# Patient Record
Sex: FEMALE | Race: WHITE | NOT HISPANIC OR LATINO | Employment: FULL TIME | URBAN - METROPOLITAN AREA
[De-identification: names, ages, dates, MRNs, and addresses within clinical notes are randomized per-mention and may not be internally consistent; named-entity substitution may affect disease eponyms.]

---

## 2023-03-07 ENCOUNTER — TELEPHONE (OUTPATIENT)
Dept: PRIMARY CARE | Facility: CLINIC | Age: 68
End: 2023-03-07
Payer: MEDICARE

## 2023-03-14 LAB
ALANINE AMINOTRANSFERASE (SGPT) (U/L) IN SER/PLAS: 12 U/L (ref 7–45)
ALBUMIN (G/DL) IN SER/PLAS: 4.2 G/DL (ref 3.4–5)
ALKALINE PHOSPHATASE (U/L) IN SER/PLAS: 74 U/L (ref 33–136)
ANION GAP IN SER/PLAS: 13 MMOL/L (ref 10–20)
ASPARTATE AMINOTRANSFERASE (SGOT) (U/L) IN SER/PLAS: 20 U/L (ref 9–39)
BASOPHILS (10*3/UL) IN BLOOD BY AUTOMATED COUNT: 0.07 X10E9/L (ref 0–0.1)
BASOPHILS/100 LEUKOCYTES IN BLOOD BY AUTOMATED COUNT: 1.3 % (ref 0–2)
BILIRUBIN TOTAL (MG/DL) IN SER/PLAS: 0.4 MG/DL (ref 0–1.2)
CALCIDIOL (25 OH VITAMIN D3) (NG/ML) IN SER/PLAS: 37 NG/ML
CALCIUM (MG/DL) IN SER/PLAS: 9.8 MG/DL (ref 8.6–10.3)
CARBON DIOXIDE, TOTAL (MMOL/L) IN SER/PLAS: 27 MMOL/L (ref 21–32)
CHLORIDE (MMOL/L) IN SER/PLAS: 103 MMOL/L (ref 98–107)
CREATININE (MG/DL) IN SER/PLAS: 1.06 MG/DL (ref 0.5–1.05)
EOSINOPHILS (10*3/UL) IN BLOOD BY AUTOMATED COUNT: 0.16 X10E9/L (ref 0–0.7)
EOSINOPHILS/100 LEUKOCYTES IN BLOOD BY AUTOMATED COUNT: 2.9 % (ref 0–6)
ERYTHROCYTE DISTRIBUTION WIDTH (RATIO) BY AUTOMATED COUNT: 15.8 % (ref 11.5–14.5)
ERYTHROCYTE MEAN CORPUSCULAR HEMOGLOBIN CONCENTRATION (G/DL) BY AUTOMATED: 31.3 G/DL (ref 32–36)
ERYTHROCYTE MEAN CORPUSCULAR VOLUME (FL) BY AUTOMATED COUNT: 85 FL (ref 80–100)
ERYTHROCYTES (10*6/UL) IN BLOOD BY AUTOMATED COUNT: 4.32 X10E12/L (ref 4–5.2)
GFR FEMALE: 57 ML/MIN/1.73M2
GLUCOSE (MG/DL) IN SER/PLAS: 89 MG/DL (ref 74–99)
HEMATOCRIT (%) IN BLOOD BY AUTOMATED COUNT: 36.7 % (ref 36–46)
HEMOGLOBIN (G/DL) IN BLOOD: 11.5 G/DL (ref 12–16)
IMMATURE GRANULOCYTES/100 LEUKOCYTES IN BLOOD BY AUTOMATED COUNT: 0.2 % (ref 0–0.9)
LEUKOCYTES (10*3/UL) IN BLOOD BY AUTOMATED COUNT: 5.6 X10E9/L (ref 4.4–11.3)
LYMPHOCYTES (10*3/UL) IN BLOOD BY AUTOMATED COUNT: 1.55 X10E9/L (ref 1.2–4.8)
LYMPHOCYTES/100 LEUKOCYTES IN BLOOD BY AUTOMATED COUNT: 27.9 % (ref 13–44)
MONOCYTES (10*3/UL) IN BLOOD BY AUTOMATED COUNT: 0.59 X10E9/L (ref 0.1–1)
MONOCYTES/100 LEUKOCYTES IN BLOOD BY AUTOMATED COUNT: 10.6 % (ref 2–10)
NEUTROPHILS (10*3/UL) IN BLOOD BY AUTOMATED COUNT: 3.17 X10E9/L (ref 1.2–7.7)
NEUTROPHILS/100 LEUKOCYTES IN BLOOD BY AUTOMATED COUNT: 57.1 % (ref 40–80)
PLATELETS (10*3/UL) IN BLOOD AUTOMATED COUNT: 244 X10E9/L (ref 150–450)
POTASSIUM (MMOL/L) IN SER/PLAS: 4.1 MMOL/L (ref 3.5–5.3)
PROTEIN TOTAL: 8.3 G/DL (ref 6.4–8.2)
SODIUM (MMOL/L) IN SER/PLAS: 139 MMOL/L (ref 136–145)
THYROTROPIN (MIU/L) IN SER/PLAS BY DETECTION LIMIT <= 0.05 MIU/L: 5.84 MIU/L (ref 0.44–3.98)
THYROXINE (T4) FREE (NG/DL) IN SER/PLAS: 0.88 NG/DL (ref 0.61–1.12)
UREA NITROGEN (MG/DL) IN SER/PLAS: 17 MG/DL (ref 6–23)

## 2023-03-15 LAB
PARATHYRIN INTACT (PG/ML) IN SER/PLAS: 40.3 PG/ML (ref 18.5–88)
POC CALCIUM IONIZED (MMOL/L) IN BLOOD: 1.28 MMOL/L (ref 1.1–1.33)

## 2023-04-17 LAB
ALBUMIN ELP: 3.7 G/DL (ref 3.4–5)
ALPHA 1: 0.3 G/DL (ref 0.2–0.6)
ALPHA 2: 1.1 G/DL (ref 0.4–1.1)
BETA: 1.1 G/DL (ref 0.5–1.2)
GAMMA GLOBULIN: 1.4 G/DL (ref 0.5–1.4)
PATH REVIEW-SERUM PROTEIN ELECTROPHORESIS: NORMAL
PROTEIN ELECTROPHORESIS INTERPRETATION: NORMAL
PROTEIN TOTAL: 7.6 G/DL (ref 6.4–8.2)

## 2023-08-31 PROBLEM — R79.89 ELEVATED LFTS: Status: ACTIVE | Noted: 2023-08-31

## 2023-08-31 PROBLEM — H25.091 AGE-RELATED INCIPIENT CATARACT OF RIGHT EYE: Status: ACTIVE | Noted: 2023-08-31

## 2023-08-31 PROBLEM — R94.4 DECREASED GFR: Status: ACTIVE | Noted: 2023-08-31

## 2023-08-31 PROBLEM — F41.9 ANXIETY AND DEPRESSION: Status: ACTIVE | Noted: 2023-08-31

## 2023-08-31 PROBLEM — R82.81 PYURIA: Status: ACTIVE | Noted: 2023-08-31

## 2023-08-31 PROBLEM — M19.90 OSTEOARTHRITIS: Status: ACTIVE | Noted: 2023-08-31

## 2023-08-31 PROBLEM — N32.89 MASS OF URINARY BLADDER DETERMINED BY ULTRASOUND: Status: ACTIVE | Noted: 2023-08-31

## 2023-08-31 PROBLEM — R80.9 PROTEINURIA: Status: ACTIVE | Noted: 2023-08-31

## 2023-08-31 PROBLEM — M81.0 OSTEOPOROSIS: Status: ACTIVE | Noted: 2023-08-31

## 2023-08-31 PROBLEM — E03.9 HYPOTHYROIDISM: Status: ACTIVE | Noted: 2023-08-31

## 2023-08-31 PROBLEM — R76.8 ANA POSITIVE: Status: ACTIVE | Noted: 2023-08-31

## 2023-08-31 PROBLEM — I10 ESSENTIAL HYPERTENSION: Status: ACTIVE | Noted: 2023-08-31

## 2023-08-31 PROBLEM — H35.351 CYSTOID MACULAR EDEMA OF RIGHT EYE: Status: ACTIVE | Noted: 2023-08-31

## 2023-08-31 PROBLEM — E66.3 OVERWEIGHT: Status: ACTIVE | Noted: 2023-08-31

## 2023-08-31 PROBLEM — R20.9 SENSORY DISTURBANCE: Status: ACTIVE | Noted: 2023-08-31

## 2023-08-31 PROBLEM — G89.29 BACK PAIN, CHRONIC: Status: ACTIVE | Noted: 2023-08-31

## 2023-08-31 PROBLEM — H33.21 RIGHT RETINAL DETACHMENT: Status: ACTIVE | Noted: 2023-08-31

## 2023-08-31 PROBLEM — N18.32 STAGE 3B CHRONIC KIDNEY DISEASE (MULTI): Status: ACTIVE | Noted: 2023-08-31

## 2023-08-31 PROBLEM — K75.4 AUTOIMMUNE HEPATITIS (MULTI): Status: ACTIVE | Noted: 2023-08-31

## 2023-08-31 PROBLEM — R29.898 ABNORMAL MUSCLE TONE: Status: ACTIVE | Noted: 2023-08-31

## 2023-08-31 PROBLEM — M11.20 CALCIUM PYROPHOSPHATE DEPOSITION DISEASE: Status: ACTIVE | Noted: 2023-08-31

## 2023-08-31 PROBLEM — R73.01 IMPAIRED FASTING GLUCOSE: Status: ACTIVE | Noted: 2023-08-31

## 2023-08-31 PROBLEM — M54.9 BACK PAIN, CHRONIC: Status: ACTIVE | Noted: 2023-08-31

## 2023-08-31 PROBLEM — I95.9 LOW BLOOD PRESSURE: Status: ACTIVE | Noted: 2023-08-31

## 2023-08-31 PROBLEM — M35.00 SJOGREN'S SYNDROME (MULTI): Status: ACTIVE | Noted: 2023-08-31

## 2023-08-31 PROBLEM — D64.9 ANEMIA: Status: ACTIVE | Noted: 2023-08-31

## 2023-08-31 PROBLEM — N64.89 BREAST ASYMMETRY IN FEMALE: Status: ACTIVE | Noted: 2023-08-31

## 2023-08-31 PROBLEM — F32.A ANXIETY AND DEPRESSION: Status: ACTIVE | Noted: 2023-08-31

## 2023-08-31 PROBLEM — M25.50 ARTHRALGIA: Status: ACTIVE | Noted: 2023-08-31

## 2023-08-31 PROBLEM — E55.9 VITAMIN D DEFICIENCY: Status: ACTIVE | Noted: 2023-08-31

## 2023-08-31 PROBLEM — E78.5 HYPERLIPIDEMIA: Status: ACTIVE | Noted: 2023-08-31

## 2023-08-31 PROBLEM — E21.3 HYPERPARATHYROIDISM (MULTI): Status: ACTIVE | Noted: 2023-08-31

## 2023-08-31 PROBLEM — M54.17 LUMBOSACRAL RADICULITIS: Status: ACTIVE | Noted: 2023-08-31

## 2023-08-31 PROBLEM — H04.123 DRY EYE SYNDROME OF BOTH EYES: Status: ACTIVE | Noted: 2023-08-31

## 2023-08-31 RX ORDER — LEVOTHYROXINE SODIUM 50 UG/1
1 TABLET ORAL EVERY OTHER DAY
COMMUNITY
End: 2023-11-17 | Stop reason: WASHOUT

## 2023-08-31 RX ORDER — ATENOLOL 50 MG/1
1 TABLET ORAL DAILY
COMMUNITY
End: 2023-12-18

## 2023-08-31 RX ORDER — SERTRALINE HYDROCHLORIDE 25 MG/1
1 TABLET, FILM COATED ORAL DAILY
COMMUNITY
Start: 2021-01-22 | End: 2024-01-10 | Stop reason: WASHOUT

## 2023-08-31 RX ORDER — AMLODIPINE BESYLATE 10 MG/1
1 TABLET ORAL DAILY
COMMUNITY
Start: 2022-03-07 | End: 2023-10-11

## 2023-08-31 RX ORDER — CHOLECALCIFEROL (VITAMIN D3) 125 MCG
125 CAPSULE ORAL
COMMUNITY
Start: 2016-04-14 | End: 2024-01-10 | Stop reason: ALTCHOICE

## 2023-08-31 RX ORDER — HYDROXYCHLOROQUINE SULFATE 200 MG/1
1 TABLET, FILM COATED ORAL DAILY
COMMUNITY
Start: 2016-10-17 | End: 2024-02-01

## 2023-08-31 RX ORDER — PILOCARPINE HYDROCHLORIDE 5 MG/1
1 TABLET, FILM COATED ORAL 3 TIMES DAILY
COMMUNITY
Start: 2019-04-19 | End: 2024-01-10 | Stop reason: WASHOUT

## 2023-08-31 RX ORDER — ATENOLOL 100 MG/1
1 TABLET ORAL DAILY
COMMUNITY
Start: 2016-10-03 | End: 2023-12-18 | Stop reason: WASHOUT

## 2023-08-31 RX ORDER — DULOXETIN HYDROCHLORIDE 60 MG/1
1 CAPSULE, DELAYED RELEASE ORAL DAILY
COMMUNITY
End: 2024-01-10 | Stop reason: SDUPTHER

## 2023-08-31 RX ORDER — CYCLOBENZAPRINE HCL 5 MG
1 TABLET ORAL NIGHTLY PRN
COMMUNITY
Start: 2021-07-22 | End: 2024-01-10 | Stop reason: WASHOUT

## 2023-08-31 RX ORDER — ATORVASTATIN CALCIUM 40 MG/1
1 TABLET, FILM COATED ORAL DAILY
COMMUNITY
Start: 2016-10-03 | End: 2023-12-13

## 2023-08-31 RX ORDER — AMLODIPINE BESYLATE 5 MG/1
1 TABLET ORAL NIGHTLY
COMMUNITY
Start: 2016-10-03 | End: 2023-10-11 | Stop reason: DRUGHIGH

## 2023-08-31 RX ORDER — CYCLOSPORINE 0.5 MG/ML
1 EMULSION OPHTHALMIC EVERY 12 HOURS
COMMUNITY
Start: 2019-07-10 | End: 2023-11-29 | Stop reason: SDUPTHER

## 2023-08-31 RX ORDER — LEVOTHYROXINE SODIUM 25 UG/1
1 TABLET ORAL DAILY
COMMUNITY
Start: 2016-10-03 | End: 2023-11-17 | Stop reason: WASHOUT

## 2023-08-31 RX ORDER — GABAPENTIN 600 MG/1
1 TABLET ORAL 3 TIMES DAILY
COMMUNITY
Start: 2022-11-16 | End: 2024-01-08 | Stop reason: SDUPTHER

## 2023-08-31 RX ORDER — LEVOTHYROXINE SODIUM 25 UG/1
1 TABLET ORAL
COMMUNITY
End: 2023-11-17 | Stop reason: WASHOUT

## 2023-08-31 RX ORDER — BENAZEPRIL HYDROCHLORIDE AND HYDROCHLOROTHIAZIDE 20; 25 MG/1; MG/1
1 TABLET ORAL DAILY
COMMUNITY
End: 2024-01-10 | Stop reason: ALTCHOICE

## 2023-08-31 RX ORDER — BUTALB/ACETAMINOPHEN/CAFFEINE 50-325-40
1 TABLET ORAL 2 TIMES DAILY
COMMUNITY
Start: 2020-10-25 | End: 2024-01-10 | Stop reason: ALTCHOICE

## 2023-08-31 RX ORDER — CALCIUM CITRATE/VITAMIN D3 315MG-6.25
2 TABLET ORAL 2 TIMES DAILY
COMMUNITY
End: 2024-01-10 | Stop reason: ALTCHOICE

## 2023-08-31 RX ORDER — CEVIMELINE HYDROCHLORIDE 30 MG/1
1 CAPSULE ORAL 3 TIMES DAILY
COMMUNITY
End: 2024-04-22

## 2023-08-31 RX ORDER — DENOSUMAB 60 MG/ML
60 INJECTION SUBCUTANEOUS
COMMUNITY
Start: 2021-12-20 | End: 2023-10-03 | Stop reason: SDUPTHER

## 2023-09-06 LAB — CALCIDIOL (25 OH VITAMIN D3) (NG/ML) IN SER/PLAS: 36 NG/ML

## 2023-09-22 LAB
ALANINE AMINOTRANSFERASE (SGPT) (U/L) IN SER/PLAS: 16 U/L (ref 7–45)
ALBUMIN (G/DL) IN SER/PLAS: 4 G/DL (ref 3.4–5)
ALKALINE PHOSPHATASE (U/L) IN SER/PLAS: 67 U/L (ref 33–136)
ASPARTATE AMINOTRANSFERASE (SGOT) (U/L) IN SER/PLAS: 27 U/L (ref 9–39)
BILIRUBIN DIRECT (MG/DL) IN SER/PLAS: 0.1 MG/DL (ref 0–0.3)
BILIRUBIN TOTAL (MG/DL) IN SER/PLAS: 0.4 MG/DL (ref 0–1.2)
PROTEIN TOTAL: 8 G/DL (ref 6.4–8.2)

## 2023-10-03 DIAGNOSIS — M81.0 OSTEOPOROSIS, UNSPECIFIED OSTEOPOROSIS TYPE, UNSPECIFIED PATHOLOGICAL FRACTURE PRESENCE: Primary | ICD-10-CM

## 2023-10-03 RX ORDER — ALBUTEROL SULFATE 0.83 MG/ML
3 SOLUTION RESPIRATORY (INHALATION) AS NEEDED
Status: CANCELLED | OUTPATIENT
Start: 2023-10-16

## 2023-10-03 RX ORDER — FAMOTIDINE 10 MG/ML
20 INJECTION INTRAVENOUS ONCE AS NEEDED
Status: CANCELLED | OUTPATIENT
Start: 2023-10-16

## 2023-10-03 RX ORDER — EPINEPHRINE 0.3 MG/.3ML
0.3 INJECTION SUBCUTANEOUS EVERY 5 MIN PRN
Status: CANCELLED | OUTPATIENT
Start: 2023-10-16

## 2023-10-03 RX ORDER — DIPHENHYDRAMINE HYDROCHLORIDE 50 MG/ML
50 INJECTION INTRAMUSCULAR; INTRAVENOUS AS NEEDED
Status: CANCELLED | OUTPATIENT
Start: 2023-10-16

## 2023-10-03 NOTE — PROGRESS NOTES
EPIC order conversion -- continuation of Prolia 60mg q 6 months. Next [facility] 10/16/23 at [facility].

## 2023-10-12 ENCOUNTER — TELEPHONE (OUTPATIENT)
Dept: PRIMARY CARE | Facility: CLINIC | Age: 68
End: 2023-10-12

## 2023-10-12 ENCOUNTER — LAB (OUTPATIENT)
Dept: LAB | Facility: LAB | Age: 68
End: 2023-10-12
Payer: MEDICARE

## 2023-10-12 DIAGNOSIS — M81.0 OSTEOPOROSIS, UNSPECIFIED OSTEOPOROSIS TYPE, UNSPECIFIED PATHOLOGICAL FRACTURE PRESENCE: ICD-10-CM

## 2023-10-12 LAB
ALBUMIN SERPL BCP-MCNC: 4.1 G/DL (ref 3.4–5)
ALP SERPL-CCNC: 66 U/L (ref 33–136)
ALT SERPL W P-5'-P-CCNC: 15 U/L (ref 7–45)
ANION GAP SERPL CALC-SCNC: 13 MMOL/L (ref 10–20)
AST SERPL W P-5'-P-CCNC: 25 U/L (ref 9–39)
BILIRUB SERPL-MCNC: 0.4 MG/DL (ref 0–1.2)
BUN SERPL-MCNC: 21 MG/DL (ref 6–23)
CALCIUM SERPL-MCNC: 9.5 MG/DL (ref 8.6–10.3)
CHLORIDE SERPL-SCNC: 103 MMOL/L (ref 98–107)
CO2 SERPL-SCNC: 26 MMOL/L (ref 21–32)
CREAT SERPL-MCNC: 1.29 MG/DL (ref 0.5–1.05)
GFR SERPL CREATININE-BSD FRML MDRD: 45 ML/MIN/1.73M*2
GLUCOSE SERPL-MCNC: 91 MG/DL (ref 74–99)
POTASSIUM SERPL-SCNC: 3.9 MMOL/L (ref 3.5–5.3)
PROT SERPL-MCNC: 7.9 G/DL (ref 6.4–8.2)
SODIUM SERPL-SCNC: 138 MMOL/L (ref 136–145)

## 2023-10-12 PROCEDURE — 36415 COLL VENOUS BLD VENIPUNCTURE: CPT

## 2023-10-12 PROCEDURE — 80053 COMPREHEN METABOLIC PANEL: CPT

## 2023-10-12 NOTE — TELEPHONE ENCOUNTER
Okay for your return. Aleisha from  specialty pharmacy is calling on behalf of patient. She said that patient's medication is still rejecting and they are wondering if you have submitted a PA or not this or not? They did not disclose which medication they are referring to.   [phone]

## 2023-10-13 ENCOUNTER — OFFICE VISIT (OUTPATIENT)
Dept: PAIN MEDICINE | Facility: CLINIC | Age: 68
End: 2023-10-13
Payer: MEDICARE

## 2023-10-13 VITALS
RESPIRATION RATE: 16 BRPM | DIASTOLIC BLOOD PRESSURE: 63 MMHG | TEMPERATURE: 96 F | SYSTOLIC BLOOD PRESSURE: 111 MMHG | HEART RATE: 80 BPM

## 2023-10-13 DIAGNOSIS — M54.17 LUMBOSACRAL RADICULITIS: Primary | ICD-10-CM

## 2023-10-13 PROCEDURE — 99213 OFFICE O/P EST LOW 20 MIN: CPT | Performed by: PHYSICIAN ASSISTANT

## 2023-10-13 PROCEDURE — 99212 OFFICE O/P EST SF 10 MIN: CPT | Mod: ZK | Performed by: PHYSICIAN ASSISTANT

## 2023-10-13 RX ORDER — GABAPENTIN 600 MG/1
600 TABLET ORAL 3 TIMES DAILY
Qty: 90 TABLET | Refills: 2 | Status: SHIPPED | OUTPATIENT
Start: 2023-10-13 | End: 2024-10-12

## 2023-10-13 ASSESSMENT — PAIN SCALES - GENERAL: PAINLEVEL: 10-WORST PAIN EVER

## 2023-10-13 NOTE — PATIENT INSTRUCTIONS
Follow up in 3 months or sooner if needed    Call if back pain is not better or worsening or if you would like to schedule an injection     Please do not hesitate to contact the pain clinic after your visit with any questions or concerns at  M-F 8-4 pm

## 2023-10-13 NOTE — PROGRESS NOTES
"Subjective   Patient ID: Loulou Hand is a 68 y.o. female who presents for  refill; pain to the lower back goes down buttocks rates 10 /10  HPI    Denies any new health issues since last visit  Pain level 6-7/10 across her lower back to her buttocks   Denies any numbness or tingling   She alternates heat and ice to help with the pain   \"I was taking gabpentin but I am out of it,\" says she has been out of it for 2-3 weeks   Back pain started about 3 weeks  when she ran out of her gabapentin   Denies any side effects when she was on gabapentin   Had 3 epidurals done for her back, says the first two helped but the 3rd one,\"Did nothing,\" yet last two epidurals were both a year apart suggesting that they may have indeed provided some relief   Bending aggravates her pain, sitting down also aggravates the pain  When she stands up from a sitting position, she feels stiff  Last lumbar epidural was 12/2022    Patient denies any new onset of weakness in the lower extremity or upper extremity or any new bladder or bowel dysfunction.      Review of Systems  All 13 systems were reviewed and are within normal levels except as noted below or per HPI. Positive and pertinent negative responses are noted below or in the HPI   Denied any fever or chills. No weight loss and no night sweats. No cough or sputum production. No diarrhea   No constipation  No bladder and bowel incontinence and no other changes in bladder and bowel. No skin changes.   Denied opioids diversion and abuse and denies alcoholism. Denies overuse of pain medications.      Past medical history  interval changes as noted in hpi    Objective   Physical Exam  General:   Alert, oriented, pleasant, and cooperative. Does not appear to be in any major distress.     HEENT:   Pupils normal in size. Ears, nose, mouth, and throat appear to be in normal condition. Head atraumatic.   No signs of sedation or withdrawal apparent.    Psychiatric:   No signs of depression " apparent.     Neuro:   No focal neurological deficit apparent. Ambulation at baseline.     Respiratory:   Normal respiratory distress     Abdomen:   No distention     Skin:   No skin markings supportive of recent IV drug use.     Cardiovascular:   Regular rate.        Assessment/Plan   Diagnoses and all orders for this visit:  Lumbosacral radiculitis  -     gabapentin (Neurontin) 600 mg tablet; Take 1 tablet (600 mg) by mouth 3 times a day.  -     Schedule appointment; Future    Plan:  Restart gabapentin   Call if back pain is not better or worsening or if you would like to schedule an injection   Follow up in 3 months or sooner if needed    [place] dated TODAY  reviewed. No signs or abuse or misuse of prescribed medication.    Patient was reminded not to share medications, not to take prescription medications that were not prescribed to  the patient, and not to increase or change dose without consulting the pain clinic. I advised the patient to always the least amount of medication needed to keep symptoms under control.    Advised to continue following the guidelines to prevent the spread of COVID19  1. Frequent handwashing.  2. Avoiding touching face.  3. Sneeze or cough into a tissue or inside your elbow.  4. Disinfect frequently used items and surfaces as much as possible.  5. Wear a mask around people.  6. If you feel sick stay home.  7. Practice social distancing.  8. Avoid discretionary traveling, shopping trips, and social visits    Assessment/Plan:. The most applicable treatment options considered and discussed with the patient at this time, including a combination of physical therapy approaches, psychological/psychiatric approaches, pharmacologic management, interventional procedures and pain management surgeries (such as spinal cord stimulation and intrathecal pump placement. Risk of complications and/or morbidity and mortality is high given the acute and chronic pain may pose a threat to life and bodily  functions if undertreated, poorly treated or with failure to maintain adequate and timely follow-up. Given the serious and fluctuating nature of pain (with extensive considerations whenever pain changes, worsens and even if it improves), there always remains the possibility of prolonged functional impairment requiring constant patient re-assessment and high level medical decision making. The amount and complexity of data reviewed is high given the patient labs, radiology reports and other tests were obtained and reviewed (summarized as applicable). Pertinent positive and negative findings were considered in medical decision making.   The patient was counseled regarding diagnostic results, instructions for management, risk factor reductions, prognosis, patient and family education, impressions, risks and benefits of treatment options and importance of compliance with treatment.    The level of clinical decision making in this office visit, is high, given the high risks of complications with the morbidity and mortality due to the fact that acute and chronic pain may pose a threat to the life and bodily function, if under treated, poorly treated, or with failure to maintain adequate treatment and timely medical follow up. Additionally over treatment has its own set of complications including overdosing on the pain medications and also the habit forming effects of the medications used to treat chronic painful conditions including therapeutic classes classified as dangerous medications. Given the serious and fluctuating nature of pain with extensive consideration for whenever pain changes, there is always the risk of prolonged functional impairment requiring close patient assessment and reassessment and high level medical decision making at every office visit. The amount and complexity of data reviewed is high given the patient clinical presentation, labs,  data, radiology reports, and other tests as above. Pertinent  data whether positive or negative were taken in consideration in the process of making this high level medical decision.     The patient was counseled regarding risk factor reductions, prognosis, patient and family education, impressions, risks and benefits of treatment options and importance of compliance with treatment.     Total amount of time spent with patient was 16 minutes with more than 50% of the time devoted to history taking physical examination face-to-face time and coordination of care  Thank you for allowing me to participate in the care of this patient  Plan was explained to patient, and the patient verbalized understanding and agreement with the plan.           *Please note this report has been produced using speech recognition software and may contain errors related to that system including grammar, punctuation and spelling as well as words and phrases that may be inappropriate. If there are questions or concerns, please feel free to contact me to clarify.

## 2023-10-16 ENCOUNTER — APPOINTMENT (OUTPATIENT)
Dept: INFUSION THERAPY | Facility: CLINIC | Age: 68
End: 2023-10-16
Payer: MEDICARE

## 2023-10-16 ENCOUNTER — INFUSION (OUTPATIENT)
Dept: INFUSION THERAPY | Facility: CLINIC | Age: 68
End: 2023-10-16
Payer: MEDICARE

## 2023-10-16 VITALS
SYSTOLIC BLOOD PRESSURE: 141 MMHG | HEART RATE: 77 BPM | RESPIRATION RATE: 16 BRPM | DIASTOLIC BLOOD PRESSURE: 84 MMHG | OXYGEN SATURATION: 97 % | TEMPERATURE: 97.6 F

## 2023-10-16 DIAGNOSIS — M81.0 OSTEOPOROSIS, UNSPECIFIED OSTEOPOROSIS TYPE, UNSPECIFIED PATHOLOGICAL FRACTURE PRESENCE: Primary | ICD-10-CM

## 2023-10-16 PROCEDURE — 96372 THER/PROPH/DIAG INJ SC/IM: CPT | Performed by: INTERNAL MEDICINE

## 2023-10-16 RX ORDER — DIPHENHYDRAMINE HYDROCHLORIDE 50 MG/ML
50 INJECTION INTRAMUSCULAR; INTRAVENOUS AS NEEDED
Status: CANCELLED | OUTPATIENT
Start: 2024-04-13

## 2023-10-16 RX ORDER — ALBUTEROL SULFATE 0.83 MG/ML
3 SOLUTION RESPIRATORY (INHALATION) AS NEEDED
Status: CANCELLED | OUTPATIENT
Start: 2024-04-13

## 2023-10-16 RX ORDER — EPINEPHRINE 0.3 MG/.3ML
0.3 INJECTION SUBCUTANEOUS EVERY 5 MIN PRN
Status: CANCELLED | OUTPATIENT
Start: 2024-04-13

## 2023-10-16 RX ORDER — FAMOTIDINE 10 MG/ML
20 INJECTION INTRAVENOUS ONCE AS NEEDED
Status: CANCELLED | OUTPATIENT
Start: 2024-04-13

## 2023-10-16 ASSESSMENT — ENCOUNTER SYMPTOMS
FATIGUE: 1
GASTROINTESTINAL NEGATIVE: 1
PSYCHIATRIC NEGATIVE: 1
CARDIOVASCULAR NEGATIVE: 1
MUSCULOSKELETAL NEGATIVE: 1
RESPIRATORY NEGATIVE: 1
HEMATOLOGIC/LYMPHATIC NEGATIVE: 1
EYES NEGATIVE: 1
ENDOCRINE NEGATIVE: 1
NEUROLOGICAL NEGATIVE: 1

## 2023-10-16 NOTE — PATIENT INSTRUCTIONS
Today you received: ( free text drug name and dose, including premedication's)    Prolia 60 mg  Every 6 months. Have calcium level drawn at least 1 week prior to next injection.    For:   1. Osteoporosis, unspecified osteoporosis type, unspecified pathological fracture presence          Please read the  Medication Guide that was given to you and reviewed during todays visit.     (Tell all doctors including dentists that you are taking this medication)     Go to the emergency room or call 911 if:  -You have signs of allergic reaction:   o         Rash, hives, itching.   o         Swollen, blistered, peeling skin.   o         Swelling of face, lips, mouth, tongue or throat.   o         Tightness of chest, trouble breathing, swallowing or talking      Call your doctor:     - If IV / injection site gets red, warm, swollen, itchy or leaks fluid or pus.     (Leave dressing on your IV site for at least 2 hours and keep area clean and dry  - If you get sick or have symptoms of infection or are not feeling well for any reason.    (Wash your hands often, stay away from people who are sick)  - If you have side effects from your medication that do not go away or are bothersome.     (Refer to the teaching your nurse gave you for side effects to call your doctor about)     Common side effects may include:  stuffy nose, headache, feeling tired, muscle aches, upset stomach  - Before receiving any vaccines, Call the [facility] at   if:  - You get sick, are on antibiotics, have had a recent vaccine, have surgery or dental work and your doctor wants your visit rescheduled.  - You need to cancel and reschedule your visit for any reason. Call at least 2 days before your visit if you need to cancel.   - Your insurance changes before your next visit.    (We will need to get approval from your new insurance. This can take up to two weeks.)     The [facility] is opened Monday thru Friday. We are closed on  weekends and holidays.     Voice mail will take your call if the center is closed. If you leave a message please allow 24 hours for a call back during weekdays. If you leave a message on a weekend/holiday, we will call you back the next business day.      Note authored and patient cared for by: Loulou Ryan RN    Note/Encounter reviewed by: DEBBIE Petty, RAVINP-C. This provider on site at time of patient injection. Infusion staff to notify this provider of any questions, concerns, abnormals or issues during visit. No issues reported. Pt. tolerated injection without difficulty. Pt. not independently evaluated by this provider during today's encounter.

## 2023-10-16 NOTE — PROGRESS NOTES
[facility]   infusion Clinic Note   Date: 2023   Name: Louolu Hand  : 1955   MRN: [id number]         Reason for Visit: Injections (Prolia injection 60 mg)       Visit Type: INJECTION     Ordered By: BETSY   Accompanied by:Self      Diagnosis: Osteoporosis, unspecified osteoporosis type, unspecified pathological fracture presence      Allergies:   Allergies as of 10/16/2023   • (No Known Allergies)        Current Medications has a current medication list which includes the following prescription(s): amlodipine, atenolol, atenolol, atorvastatin, benazepril-hydrochlorothiazide, calcium citrate-vitamin d3, calcium citrate-vitamin d3, cevimeline, cholecalciferol, cyclobenzaprine, cyclosporine, denosumab, duloxetine, gabapentin, gabapentin, hydroxychloroquine, levothyroxine, levothyroxine, levothyroxine, pilocarpine, and sertraline.          Vitals:  Vitals:    10/16/23 1336   BP: 141/84   BP Location: Right arm   Patient Position: Sitting   BP Cuff Size: Adult   Pulse: 77   Resp: 16   Temp: 36.4 °C (97.6 °F)   TempSrc: Temporal   SpO2: 97%          Infusion Pre-procedure Checklist:   Allergies reviewed: yes   Medications reviewed: yes     Previous reaction to current treatment: No      Assess patient for the concerns below. Document provider notification as appropriate.  - Active or recent infection with/without current antibiotic use: no  - Recent or planned invasive dental work: no  - Recent or planned surgeries: no  - Recently received or plans to receive vaccinations: no  - Has treatment related toxicities: no  - Is pregnant:  no    - Does the patient meet criteria to treat? Yes    Provider notified? Not applicable      Pain: 0    Is the pain different from normal: n/a   Is the pain tolerable: n/a   Is your Doctor aware: n/a       Labs:  CALCIUM LEVEL 9.5      Fall Risk Screening:         Review of Systems   Constitutional:  Positive for fatigue.   HENT:  Negative.      Eyes: Negative.    Respiratory: Negative.     Cardiovascular: Negative.    Gastrointestinal: Negative.    Endocrine: Negative.    Genitourinary: Negative.     Musculoskeletal: Negative.         GENERALIZED WEAKNESS     Skin: Negative.    Neurological: Negative.    Hematological: Negative.    Psychiatric/Behavioral: Negative.     GENERALIZED WEAKNESS      Infusion Readiness:   Assessment Concerns Related to Infusion: No  Provider notified: n/a      Document Below Only If Indicated:   New Patient Education:  N/A (returning patient for continuation of therapy. Ongoing education provided as needed.)       Drug Specific Questions:  IS THE PATIENT TAKING CALCIUM AND VITAMIN D? YES   (RECOMMENDED)    DOES THE PT HAVE ANY DENTAL ISSUES?  NO    HAS THE PT HAD DENTAL PROCEDURE IN THE LAST 4 WEEKS OR IN THE NEXT 4 WEEKS? NO    Pt Instructed on following risks: (1) hypocalcemia, (2) osteonecrosis of the jaw, (3) atypical femoral fractures, (4) serious infections, and (5) dermatologic reactions  YES AND DENIES SYMPTOMS    ASEPTIC TECHNIQUE USED TO CLEANSE FOR INJECTION. BANDAGE APPLIED TO RUE        Weight Based Drug Calculations:  WEIGHT BASED DRUGS: NOT APPLICABLE           Initiated By: Loulou Ryan RN   Time: 1:54 PM     We administered denosumab.

## 2023-11-29 DIAGNOSIS — H04.123 DRY EYE SYNDROME OF BOTH EYES: Primary | ICD-10-CM

## 2023-11-29 RX ORDER — CYCLOSPORINE 0.5 MG/ML
1 EMULSION OPHTHALMIC EVERY 12 HOURS
Qty: 60 EACH | Refills: 3 | Status: SHIPPED | OUTPATIENT
Start: 2023-11-29 | End: 2024-01-03

## 2023-12-12 DIAGNOSIS — E78.5 HYPERLIPIDEMIA, UNSPECIFIED HYPERLIPIDEMIA TYPE: Primary | ICD-10-CM

## 2023-12-13 RX ORDER — ATORVASTATIN CALCIUM 40 MG/1
40 TABLET, FILM COATED ORAL DAILY
Qty: 90 TABLET | Refills: 0 | Status: SHIPPED | OUTPATIENT
Start: 2023-12-13 | End: 2024-01-10 | Stop reason: SDUPTHER

## 2023-12-18 DIAGNOSIS — I10 ESSENTIAL HYPERTENSION, BENIGN: ICD-10-CM

## 2023-12-18 RX ORDER — ATENOLOL 50 MG/1
TABLET ORAL
Qty: 135 TABLET | Refills: 0 | Status: SHIPPED | OUTPATIENT
Start: 2023-12-18 | End: 2024-01-10 | Stop reason: SDUPTHER

## 2024-01-03 ENCOUNTER — OFFICE VISIT (OUTPATIENT)
Dept: OPHTHALMOLOGY | Facility: CLINIC | Age: 69
End: 2024-01-03
Payer: MEDICARE

## 2024-01-03 DIAGNOSIS — H04.123 DRY EYE SYNDROME OF BOTH LACRIMAL GLANDS: ICD-10-CM

## 2024-01-03 DIAGNOSIS — H52.223 REGULAR ASTIGMATISM OF BOTH EYES: ICD-10-CM

## 2024-01-03 DIAGNOSIS — H02.88B MEIBOMIAN GLAND DYSFUNCTION LEFT EYE, UPPER AND LOWER EYELIDS: ICD-10-CM

## 2024-01-03 DIAGNOSIS — H16.123 FILAMENTARY KERATITIS OF BOTH EYES: ICD-10-CM

## 2024-01-03 DIAGNOSIS — Z79.899 ENCOUNTER FOR LONG-TERM (CURRENT) USE OF HIGH-RISK MEDICATION: ICD-10-CM

## 2024-01-03 DIAGNOSIS — M35.01 SJOGREN'S SYNDROME WITH KERATOCONJUNCTIVITIS SICCA (MULTI): ICD-10-CM

## 2024-01-03 DIAGNOSIS — H02.88A MEIBOMIAN GLAND DYSFUNCTION RIGHT EYE, UPPER AND LOWER EYELIDS: ICD-10-CM

## 2024-01-03 DIAGNOSIS — H16.223 KERATOCONJUNCTIVITIS SICCA OF BOTH EYES DUE TO DECREASED TEAR PRODUCTION: Primary | ICD-10-CM

## 2024-01-03 DIAGNOSIS — H52.4 PRESBYOPIA: ICD-10-CM

## 2024-01-03 DIAGNOSIS — H52.13 MYOPIA OF BOTH EYES: ICD-10-CM

## 2024-01-03 PROCEDURE — 92004 COMPRE OPH EXAM NEW PT 1/>: CPT | Performed by: OPTOMETRIST

## 2024-01-03 PROCEDURE — 92134 CPTRZ OPH DX IMG PST SGM RTA: CPT | Mod: BILATERAL PROCEDURE | Performed by: OPTOMETRIST

## 2024-01-03 PROCEDURE — 92015 DETERMINE REFRACTIVE STATE: CPT | Performed by: OPTOMETRIST

## 2024-01-03 RX ORDER — CYCLOSPORINE 0 G/ML
1 SOLUTION/ DROPS OPHTHALMIC; TOPICAL 2 TIMES DAILY
Qty: 180 EACH | Refills: 1 | Status: SHIPPED | OUTPATIENT
Start: 2024-01-03 | End: 2024-02-02

## 2024-01-03 RX ORDER — ERYTHROMYCIN 5 MG/G
OINTMENT OPHTHALMIC NIGHTLY
Qty: 3.5 G | Refills: 3 | Status: SHIPPED | OUTPATIENT
Start: 2024-01-03 | End: 2024-01-13

## 2024-01-03 ASSESSMENT — REFRACTION_WEARINGRX
OS_SPHERE: -0.25
OS_ADD: +2.25
OD_CYLINDER: -0.75
OS_CYLINDER: -0.75
OD_SPHERE: -1.00
OD_AXIS: 065
OD_ADD: +2.25
OS_AXIS: 090

## 2024-01-03 ASSESSMENT — TONOMETRY
IOP_METHOD: GOLDMANN APPLANATION
OD_IOP_MMHG: 11
OS_IOP_MMHG: 12

## 2024-01-03 ASSESSMENT — EXTERNAL EXAM - RIGHT EYE: OD_EXAM: NORMAL

## 2024-01-03 ASSESSMENT — VISUAL ACUITY
CORRECTION_TYPE: GLASSES
OS_CC: 20/60
OD_CC: 20/50-2
OD_CC: J1
OS_CC: J1
METHOD: SNELLEN - LINEAR

## 2024-01-03 ASSESSMENT — EXTERNAL EXAM - LEFT EYE: OS_EXAM: NORMAL

## 2024-01-03 ASSESSMENT — REFRACTION_MANIFEST
OS_AXIS: 090
OS_ADD: +2.50
OD_AXIS: 040
OD_CYLINDER: -1.00
OS_SPHERE: -0.50
OS_CYLINDER: -1.25
OD_SPHERE: -0.75
OD_ADD: +2.50

## 2024-01-03 ASSESSMENT — CONF VISUAL FIELD: COMMENTS: SEE HVF

## 2024-01-03 ASSESSMENT — SLIT LAMP EXAM - LIDS: COMMENTS: GOOD POSITION, 2+ MGD

## 2024-01-03 NOTE — PROGRESS NOTES
Assessment/Plan   Diagnoses and all orders for this visit:  Keratoconjunctivitis sicca of both eyes due to decreased tear production  -     erythromycin (Romycin) 5 mg/gram (0.5 %) ophthalmic ointment; Apply to both eyes once daily at bedtime for 10 days. Apply Amount per Dose: 0.5 inch (~1 cm) per dose.  -     Cequa 0.09 % dropperette; Administer 1 drop into affected eye(s) 2 times a day.  Filamentary keratitis of both eyes  -     erythromycin (Romycin) 5 mg/gram (0.5 %) ophthalmic ointment; Apply to both eyes once daily at bedtime for 10 days. Apply Amount per Dose: 0.5 inch (~1 cm) per dose.  4 filaments removed from R cornea at SL, pt tolerated well, no complications/epi defect.  Dry eye syndrome of both lacrimal glands  Meibomian gland dysfunction right eye, upper and lower eyelids  Meibomian gland dysfunction left eye, upper and lower eyelids  Severe dry eye. Switch to cequa from restasis (ineffective). Add tyrvaya intranasal spray bid (sample first can Rx if patient likes). Add back emycin qhs OU, add back ATs at least tid if not more (PF). Discussed AST vs Prokera. Can check PA for Prokera but patient does not want to pursue at this time. Also consider plug RLL, BCL but would require abx prophylaxis. Could consider scleral CLs. Monitor 3 months w/ tear osmo, SLE.     Myopia of both eyes  Regular astigmatism of both eyes  Presbyopia  New spec rx released today per patient request. Ocular health wnl for age OU. Monitor 1 year or sooner prn. Refraction billed today.    High risk medication use  Sjogrens syndrome  Studies reveal that the risk of maculopathy when taking Plaquenil is 0% (0-5 years), 1% (over 5 years), 2% (over 10 years), and 20% cumulative, or 4% annual incidence (over 20 years) respectively.  Annual testing with 10-2 threshold visual field perimetry, as well as spectral domain optical coherence tomography of the macula is recommended. No signs of plaquenil toxicity today od/os, macula OCT today,  HVF 10-2 pt unable to complete due to dry eye.   monitor 1 year or sooner with any acute vision change. HVF 10-2 and OCT Mac at next comprehensive exam.

## 2024-01-08 ENCOUNTER — OFFICE VISIT (OUTPATIENT)
Dept: PAIN MEDICINE | Facility: CLINIC | Age: 69
End: 2024-01-08
Payer: MEDICARE

## 2024-01-08 ENCOUNTER — APPOINTMENT (OUTPATIENT)
Dept: NEUROLOGY | Facility: CLINIC | Age: 69
End: 2024-01-08
Payer: MEDICARE

## 2024-01-08 DIAGNOSIS — M54.40 CHRONIC BILATERAL LOW BACK PAIN WITH SCIATICA, SCIATICA LATERALITY UNSPECIFIED: Primary | ICD-10-CM

## 2024-01-08 DIAGNOSIS — G89.29 CHRONIC BILATERAL LOW BACK PAIN WITH SCIATICA, SCIATICA LATERALITY UNSPECIFIED: Primary | ICD-10-CM

## 2024-01-08 DIAGNOSIS — M47.816 LUMBAR SPONDYLOSIS: Primary | ICD-10-CM

## 2024-01-08 PROCEDURE — 99213 OFFICE O/P EST LOW 20 MIN: CPT | Performed by: PAIN MEDICINE

## 2024-01-08 PROCEDURE — 1160F RVW MEDS BY RX/DR IN RCRD: CPT | Performed by: PAIN MEDICINE

## 2024-01-08 PROCEDURE — 1036F TOBACCO NON-USER: CPT | Performed by: PAIN MEDICINE

## 2024-01-08 PROCEDURE — 1125F AMNT PAIN NOTED PAIN PRSNT: CPT | Performed by: PAIN MEDICINE

## 2024-01-08 PROCEDURE — 1159F MED LIST DOCD IN RCRD: CPT | Performed by: PAIN MEDICINE

## 2024-01-08 RX ORDER — GABAPENTIN 600 MG/1
600 TABLET ORAL 3 TIMES DAILY
Qty: 90 TABLET | Refills: 11 | Status: SHIPPED | OUTPATIENT
Start: 2024-01-08

## 2024-01-08 ASSESSMENT — PAIN SCALES - GENERAL: PAINLEVEL: 7

## 2024-01-08 ASSESSMENT — PAIN - FUNCTIONAL ASSESSMENT: PAIN_FUNCTIONAL_ASSESSMENT: 0-10

## 2024-01-08 NOTE — H&P
History Of Present Illness  Loulou Hand is a 68 y.o. female presenting with chronic back pain currently on gabapentin 600mg tid   Patient is here today for a routine follow-up visit and medication refill confirming that the pain is very well under control with the usage of the current regimen of the medication there has been no signs of misuse or abuse of any prescription noted upon the review of the [place] automated reporting prescription.  I have personally reviewed the [place] Automated Prescription Report for this patient I have considered the risk of abuse, misuse, dependence, divergent and addiction.The patient denies any side effects associated with the usage of the medication patient described the medication improving the quality of life and allowing participation in day-to-day activity patient denies otherwise any new complaint patient is requesting a refill. Patient denies usage of any other opiate. Patient denies usage of any recreational drugs. Patient confirms that the opiate prescription is being used as prescribed.       Past Medical History  Past Medical History:   Diagnosis Date    Age-related nuclear cataract, bilateral 09/14/2022    Cataract, nuclear sclerotic, both eyes    Encounter for other screening for malignant neoplasm of breast     Breast cancer screening    Encounter for screening for malignant neoplasm of cervix     Cervical cancer screening    Hyperlipidemia, unspecified 03/07/2022    Hyperlipidemia    Hypothyroidism, unspecified 10/03/2016    Hypothyroidism    Long term (current) use of systemic steroids     Current chronic use of systemic steroids    Meibomian gland dysfunction of unspecified eye, unspecified eyelid 09/14/2022    MGD (meibomian gland disease)    Myopia, unspecified eye 09/14/2022    Myopia with astigmatism and presbyopia    Nontoxic diffuse goiter     Simple goiter    Other long term (current) drug therapy 09/15/2018    Long-term use of Plaquenil    Other long term  (current) drug therapy 01/23/2017    Long-term use of Plaquenil    Other specified abnormal immunological findings in serum 03/07/2022    PEDRO positive    Pain in unspecified joint 10/03/2016    Arthralgia    Personal history of other diseases of the nervous system and sense organs     History of impacted cerumen    Personal history of other diseases of the nervous system and sense organs     History of conjunctivitis    Personal history of other diseases of the nervous system and sense organs     History of retinal detachment    Personal history of other diseases of the respiratory system     History of sore throat    Serous retinal detachment, right eye 09/14/2022    Retinal detachment, right    Sjogren syndrome with keratoconjunctivitis (CMS/HCC) 09/14/2022    Sicca syndrome with keratoconjunctivitis    Unspecified cataract 02/06/2017    Cataract of right eye    Unspecified cataract 02/06/2017    Cataract of left eye    Unspecified cataract 02/06/2017    Cataract of right eye    Unspecified cataract 02/06/2017    Cataract of left eye    Unspecified osteoarthritis, unspecified site 11/16/2022    Osteoarthritis       Surgical History  Past Surgical History:   Procedure Laterality Date    OTHER SURGICAL HISTORY  04/13/2022    Knee arthroscopy    OTHER SURGICAL HISTORY  03/20/2022    Biopsy Of Liver    OTHER SURGICAL HISTORY  07/06/2022    Transurethral resection of bladder tumor    OTHER SURGICAL HISTORY  08/01/2022    Knee replacement    OTHER SURGICAL HISTORY  03/03/2022    Complete colonoscopy    OTHER SURGICAL HISTORY  11/19/2021    Surgery    OTHER SURGICAL HISTORY  11/19/2021    Cataract surgery    US GUIDED NEEDLE LIVER BIOPSY  9/14/2020    US GUIDED NEEDLE LIVER BIOPSY 9/14/2020 [facility]        Social History  She reports that she has never smoked. She has never used smokeless tobacco. She reports that she does not drink alcohol and does not use drugs.    Family History  Family History   Problem  Relation Name Age of Onset    Other (CARDIAC DISORDER) Mother      Heart attack Mother      Arthritis Father      Other (CARDIAC DISORDER) Father      Other (SUBSTANCE ABUSE) Father      Heart attack Sister          Allergies  Patient has no known allergies.    Review of Systems   All 13 systems were reviewed and are within normal levels except as noted below or per HPI. Positive and pertinent negative responses are noted below or in the HPI   Denied any fever or chills. No weight loss and no night sweats. No cough or sputum production. No diarrhea   No constipation  No bladder and bowel incontinence and no other changes in bladder and bowel. No skin changes.   Denied opioids diversion and abuse and denies alcoholism. Denies overuse of  pain medications.   Physical Exam       Past medical history no interval changes has been noted    On physical examination    General   Alert, oriented x3 pleasant and cooperative. Does not look in any major distress.    HEENT  Pupils normal in size. Ears, nose, mouth, and throat appear to be in normal condition.  Head atraumatic      No signs of sedation or signs of withdrawal apparent.    Psychiatric   No signs of depression apparent.    Neuro   No focal neurological deficit apparent. Ambulation at baseline.      Respiratory  No respiratory distress     Abdomen  no distention     Skin  No skin markings supportive of recent IV drug usage .    Cardiovascular  Regular rate and rhythm    Last Recorded Vitals  There were no vitals taken for this visit.    Relevant Results        Xray of lumbar spine 4/2023  IMPRESSION:  1. Lumbar spine degenerative changes as described above that are most  pronounced at L1-2, L2-3, and L5-S1.  2. Severe grade 1 L5-S1 anterolisthesis. Additional mild listhesis as  described above.  3. Mild levoscoliosis noted centered in the thoracolumbar junction.     Assessment/Plan       68 years old with history and physical examination supportive of chronic back  pain with lumbar degenerative disc disease and lumbar spondylolisthesis currently maintained gabapentin 600 mg 3 times per day     Plan  Knowing that the patient had positive response on the gabapentin 600 mg 3 times per day and knowing that the patient has no signs of misuse or abuse of the prescription I believe the benefits of continuation of the medication outweighs the risk therefore I would recommend to continue on the current regimen of the gabapentin 600 mg 3 times per day I did refill her prescription for 1 year supply patient is welcome to follow-up with the pain clinic on as-needed basis if her back pain gets out of control at that time I would recommend a trial of a lumbar epidural steroid injection to assist her with her back and leg pain patient verbalized understanding and agreement with the plan      The above clinical summary has been dictated with voice recognition software. It has not been proofread for grammatical errors, typographical mistakes, or other semantic inconsistencies.    Thank you for visiting our office today. It was our pleasure to take part in your healthcare.     Please do not hesitate to contact the pain clinic after your visit with any questions or concerns at  M-F 8-4 pm       Taylor Parnell M.D.  Medical Director , Division of Pain Medicine [facility]   of Anesthesiology and Pain Medicine  [facility] [employer] [place] [address]     Office: [phone]  Fax: [phone]       Taylor Parnell MD

## 2024-01-10 ENCOUNTER — OFFICE VISIT (OUTPATIENT)
Dept: PRIMARY CARE | Facility: CLINIC | Age: 69
End: 2024-01-10
Payer: MEDICARE

## 2024-01-10 VITALS
HEART RATE: 92 BPM | HEIGHT: 63 IN | SYSTOLIC BLOOD PRESSURE: 130 MMHG | TEMPERATURE: 97.2 F | DIASTOLIC BLOOD PRESSURE: 76 MMHG | BODY MASS INDEX: 30.19 KG/M2 | WEIGHT: 170.4 LBS

## 2024-01-10 DIAGNOSIS — E78.5 HYPERLIPIDEMIA, UNSPECIFIED HYPERLIPIDEMIA TYPE: ICD-10-CM

## 2024-01-10 DIAGNOSIS — Z23 IMMUNIZATION DUE: ICD-10-CM

## 2024-01-10 DIAGNOSIS — M75.01 ADHESIVE CAPSULITIS OF RIGHT SHOULDER: ICD-10-CM

## 2024-01-10 DIAGNOSIS — Z00.00 WELL ADULT HEALTH CHECK: Primary | ICD-10-CM

## 2024-01-10 DIAGNOSIS — E03.9 HYPOTHYROIDISM, UNSPECIFIED TYPE: ICD-10-CM

## 2024-01-10 DIAGNOSIS — I10 ESSENTIAL HYPERTENSION: ICD-10-CM

## 2024-01-10 DIAGNOSIS — M81.0 AGE-RELATED OSTEOPOROSIS WITHOUT CURRENT PATHOLOGICAL FRACTURE: ICD-10-CM

## 2024-01-10 DIAGNOSIS — Z12.31 ENCOUNTER FOR SCREENING MAMMOGRAM FOR MALIGNANT NEOPLASM OF BREAST: ICD-10-CM

## 2024-01-10 DIAGNOSIS — F41.8 MIXED ANXIETY DEPRESSIVE DISORDER: ICD-10-CM

## 2024-01-10 DIAGNOSIS — I10 ESSENTIAL HYPERTENSION, BENIGN: ICD-10-CM

## 2024-01-10 PROBLEM — D64.9 ANEMIA: Status: ACTIVE | Noted: 2022-07-19

## 2024-01-10 PROBLEM — N18.30 STAGE 3 CHRONIC KIDNEY DISEASE (MULTI): Status: ACTIVE | Noted: 2023-06-04

## 2024-01-10 PROBLEM — H04.129 TEAR FILM INSUFFICIENCY: Status: ACTIVE | Noted: 2023-08-31

## 2024-01-10 PROBLEM — H33.20 RETINAL DETACHMENT: Status: RESOLVED | Noted: 2022-06-17 | Resolved: 2024-01-10

## 2024-01-10 PROCEDURE — 99214 OFFICE O/P EST MOD 30 MIN: CPT | Performed by: FAMILY MEDICINE

## 2024-01-10 PROCEDURE — 3078F DIAST BP <80 MM HG: CPT | Performed by: FAMILY MEDICINE

## 2024-01-10 PROCEDURE — G0009 ADMIN PNEUMOCOCCAL VACCINE: HCPCS | Performed by: FAMILY MEDICINE

## 2024-01-10 PROCEDURE — 3075F SYST BP GE 130 - 139MM HG: CPT | Performed by: FAMILY MEDICINE

## 2024-01-10 PROCEDURE — 1125F AMNT PAIN NOTED PAIN PRSNT: CPT | Performed by: FAMILY MEDICINE

## 2024-01-10 PROCEDURE — 90677 PCV20 VACCINE IM: CPT | Performed by: FAMILY MEDICINE

## 2024-01-10 PROCEDURE — 1036F TOBACCO NON-USER: CPT | Performed by: FAMILY MEDICINE

## 2024-01-10 PROCEDURE — 1159F MED LIST DOCD IN RCRD: CPT | Performed by: FAMILY MEDICINE

## 2024-01-10 RX ORDER — ATENOLOL 50 MG/1
75 TABLET ORAL DAILY
Qty: 135 TABLET | Refills: 1 | Status: SHIPPED | OUTPATIENT
Start: 2024-01-10

## 2024-01-10 RX ORDER — DULOXETIN HYDROCHLORIDE 60 MG/1
60 CAPSULE, DELAYED RELEASE ORAL DAILY
Qty: 90 CAPSULE | Refills: 1 | Status: SHIPPED | OUTPATIENT
Start: 2024-01-10

## 2024-01-10 RX ORDER — AMLODIPINE BESYLATE 10 MG/1
10 TABLET ORAL DAILY
Qty: 90 TABLET | Refills: 1 | Status: SHIPPED | OUTPATIENT
Start: 2024-01-10

## 2024-01-10 RX ORDER — VITAMIN E (DL,TOCOPHERYL ACET) 45 MG/0.25
1 DROPS ORAL 2 TIMES DAILY
Qty: 180 TABLET | Refills: 1 | Status: SHIPPED | OUTPATIENT
Start: 2024-01-10

## 2024-01-10 RX ORDER — ATORVASTATIN CALCIUM 40 MG/1
40 TABLET, FILM COATED ORAL DAILY
Qty: 90 TABLET | Refills: 1 | Status: SHIPPED | OUTPATIENT
Start: 2024-01-10

## 2024-01-10 RX ORDER — LEVOTHYROXINE SODIUM 25 UG/1
TABLET ORAL
Qty: 145 TABLET | Refills: 1 | Status: SHIPPED | OUTPATIENT
Start: 2024-01-10

## 2024-01-10 ASSESSMENT — PATIENT HEALTH QUESTIONNAIRE - PHQ9
1. LITTLE INTEREST OR PLEASURE IN DOING THINGS: NOT AT ALL
SUM OF ALL RESPONSES TO PHQ9 QUESTIONS 1 AND 2: 0
2. FEELING DOWN, DEPRESSED OR HOPELESS: NOT AT ALL

## 2024-01-10 NOTE — PROGRESS NOTES
"Subjective   Patient ID: Loulou Hand is a 68 y.o. female who presents for Follow-up (Follow up).    HPI   .  Since last visit, has had regular follow-up with her gastroenterologist and rheumatologist who are managing her autoimmune hepatitis and Sjogren's syndrome respectively.  1.  Hypertension-stable on combination of amlodipine, atenolol.  Denies headaches, lightheadedness, dizziness  2.  Hyperlipidemia-stable on statin therapy.  Denies chest pains, decreased exercise tolerance.  3.  Hypothyroidism-stable on current dose of levothyroxine.  No unexplained weight changes, excess fatigue, bowel changes, swelling.  4.  Anxiety-stable on current dose of duloxetine.  5.  Right arm pain-pain with range of motion of right arm for several months.  Does not recall specific injury.  Affecting day-to-day activities.  Hard to raise arm up overhead.  Sometimes bothers her when trying to sleep.  Review of Systems  Her HPI  Objective   /76 (BP Location: Left arm, Patient Position: Sitting, BP Cuff Size: Large adult)   Pulse 92   Temp 36.2 °C (97.2 °F)   Ht 1.6 m (5' 3\")   Wt 77.3 kg (170 lb 6.4 oz)   BMI 30.19 kg/m²     Physical Exam  Vitals reviewed.   Constitutional:       General: She is not in acute distress.     Appearance: Normal appearance.   HENT:      Right Ear: Tympanic membrane and ear canal normal.      Left Ear: Tympanic membrane and ear canal normal.      Nose: No congestion or rhinorrhea.      Mouth/Throat:      Mouth: Mucous membranes are dry.      Pharynx: Oropharynx is clear.   Eyes:      Extraocular Movements: Extraocular movements intact.      Conjunctiva/sclera: Conjunctivae normal.      Pupils: Pupils are equal, round, and reactive to light.   Cardiovascular:      Rate and Rhythm: Normal rate and regular rhythm.      Pulses: Normal pulses.      Heart sounds: Normal heart sounds. No murmur heard.  Pulmonary:      Effort: Pulmonary effort is normal. No respiratory distress.      Breath sounds: " Normal breath sounds.   Abdominal:      General: Abdomen is flat. Bowel sounds are normal.      Palpations: Abdomen is soft.      Tenderness: There is no abdominal tenderness.   Musculoskeletal:      Right shoulder: Tenderness present. Decreased range of motion.      Cervical back: Normal range of motion and neck supple.      Comments: Right shoulder exam reveals painful arc of motion with abduction greater than 90 degrees both internal and external rotation.  Pain with empty can testing but no weakness.   Lymphadenopathy:      Cervical: No cervical adenopathy.   Skin:     General: Skin is warm and dry.   Neurological:      General: No focal deficit present.      Mental Status: She is alert and oriented to person, place, and time.   Psychiatric:         Mood and Affect: Mood normal.         Thought Content: Thought content normal.         Assessment/Plan   Diagnoses and all orders for this visit:  Well adult health check  -     Comprehensive Metabolic Panel; Future  Essential hypertension  -     amLODIPine (Norvasc) 10 mg tablet; Take 1 tablet (10 mg) by mouth once daily.  -     Albumin , Urine Random; Future  -     CBC and Auto Differential; Future  -     Comprehensive Metabolic Panel; Future  Essential hypertension, benign  -     atenolol (Tenormin) 50 mg tablet; Take 1.5 tablets (75 mg) by mouth once daily.  Hyperlipidemia, unspecified hyperlipidemia type  -     atorvastatin (Lipitor) 40 mg tablet; Take 1 tablet (40 mg) by mouth once daily.  -     Lipid Panel; Future  Hypothyroidism, unspecified type  -     levothyroxine (Synthroid, Levoxyl) 25 mcg tablet; TAKE ONE TABLET BY MOUTH EVERY DAY ALTERNATING WITH TWO TABLETS  -     TSH with reflex to Free T4 if abnormal; Future  Mixed anxiety depressive disorder  -     DULoxetine (Cymbalta) 60 mg DR capsule; Take 1 capsule (60 mg) by mouth once daily. Do not crush or chew.  Encounter for screening mammogram for malignant neoplasm of breast  -     BI mammo bilateral  screening tomosynthesis; Future  Age-related osteoporosis without current pathological fracture  -     calcium carb and citrate-vitD3 (Citracal-D3 Slow Release) 600 mg-12.5 mcg (500 unit) tablet extended release; Take 1 tablet by mouth 2 times a day.  -     Vitamin D 25-Hydroxy,Total (for eval of Vitamin D levels); Future  Adhesive capsulitis of right shoulder  Discussed referral to physical therapy.  Patient wishes to pursue follow-up with her previously established orthopedic provider.  -     Referral to Orthopaedic Surgery; Future  Immunization due  -     Pneumococcal conjugate vaccine, 20-valent (PREVNAR 20)  Other orders  -     Follow Up In Primary Care - Established; Future  -     Follow Up In Primary Care - Medicare Annual; Future

## 2024-01-16 RX ORDER — AMLODIPINE BESYLATE 10 MG/1
10 TABLET ORAL DAILY
Refills: 0 | OUTPATIENT
Start: 2024-01-16

## 2024-02-01 ENCOUNTER — TELEPHONE (OUTPATIENT)
Dept: PRIMARY CARE | Facility: CLINIC | Age: 69
End: 2024-02-01
Payer: MEDICARE

## 2024-02-01 DIAGNOSIS — M35.01 SJOGREN'S SYNDROME WITH KERATOCONJUNCTIVITIS SICCA (MULTI): Primary | ICD-10-CM

## 2024-02-01 RX ORDER — HYDROXYCHLOROQUINE SULFATE 200 MG/1
TABLET, FILM COATED ORAL DAILY
Qty: 30 TABLET | Refills: 4 | Status: SHIPPED | OUTPATIENT
Start: 2024-02-01

## 2024-04-16 ENCOUNTER — TELEPHONE (OUTPATIENT)
Dept: INFUSION THERAPY | Facility: CLINIC | Age: 69
End: 2024-04-16
Payer: MEDICARE

## 2024-04-17 ENCOUNTER — APPOINTMENT (OUTPATIENT)
Dept: INFUSION THERAPY | Facility: CLINIC | Age: 69
End: 2024-04-17
Payer: MEDICARE

## 2024-04-22 DIAGNOSIS — M35.01 SJOGREN SYNDROME WITH KERATOCONJUNCTIVITIS (MULTI): Primary | ICD-10-CM

## 2024-04-22 RX ORDER — CEVIMELINE HYDROCHLORIDE 30 MG/1
30 CAPSULE ORAL 3 TIMES DAILY
Qty: 90 CAPSULE | Refills: 5 | Status: SHIPPED | OUTPATIENT
Start: 2024-04-22 | End: 2024-04-24 | Stop reason: SDUPTHER

## 2024-04-24 ENCOUNTER — APPOINTMENT (OUTPATIENT)
Dept: OPHTHALMOLOGY | Facility: CLINIC | Age: 69
End: 2024-04-24
Payer: MEDICARE

## 2024-04-24 ENCOUNTER — LAB (OUTPATIENT)
Dept: LAB | Facility: LAB | Age: 69
End: 2024-04-24
Payer: MEDICARE

## 2024-04-24 DIAGNOSIS — M81.0 OSTEOPOROSIS, UNSPECIFIED OSTEOPOROSIS TYPE, UNSPECIFIED PATHOLOGICAL FRACTURE PRESENCE: ICD-10-CM

## 2024-04-24 DIAGNOSIS — Z00.00 WELL ADULT HEALTH CHECK: ICD-10-CM

## 2024-04-24 DIAGNOSIS — M35.01 SJOGREN SYNDROME WITH KERATOCONJUNCTIVITIS (MULTI): ICD-10-CM

## 2024-04-24 DIAGNOSIS — I10 ESSENTIAL HYPERTENSION: ICD-10-CM

## 2024-04-24 DIAGNOSIS — E78.5 HYPERLIPIDEMIA, UNSPECIFIED HYPERLIPIDEMIA TYPE: ICD-10-CM

## 2024-04-24 DIAGNOSIS — E03.9 HYPOTHYROIDISM, UNSPECIFIED TYPE: ICD-10-CM

## 2024-04-24 DIAGNOSIS — M81.0 AGE-RELATED OSTEOPOROSIS WITHOUT CURRENT PATHOLOGICAL FRACTURE: ICD-10-CM

## 2024-04-24 LAB
25(OH)D3 SERPL-MCNC: 38 NG/ML (ref 30–100)
ALBUMIN SERPL BCP-MCNC: 3.8 G/DL (ref 3.4–5)
ALP SERPL-CCNC: 71 U/L (ref 33–136)
ALT SERPL W P-5'-P-CCNC: 12 U/L (ref 7–45)
ANION GAP SERPL CALC-SCNC: 12 MMOL/L (ref 10–20)
AST SERPL W P-5'-P-CCNC: 19 U/L (ref 9–39)
BASOPHILS # BLD AUTO: 0.06 X10*3/UL (ref 0–0.1)
BASOPHILS NFR BLD AUTO: 1.2 %
BILIRUB SERPL-MCNC: 0.4 MG/DL (ref 0–1.2)
BUN SERPL-MCNC: 19 MG/DL (ref 6–23)
CALCIUM SERPL-MCNC: 9.4 MG/DL (ref 8.6–10.6)
CHLORIDE SERPL-SCNC: 105 MMOL/L (ref 98–107)
CHOLEST SERPL-MCNC: 176 MG/DL (ref 0–199)
CHOLESTEROL/HDL RATIO: 3.6
CO2 SERPL-SCNC: 27 MMOL/L (ref 21–32)
CREAT SERPL-MCNC: 1.08 MG/DL (ref 0.5–1.05)
CREAT UR-MCNC: 83.4 MG/DL (ref 20–320)
EGFRCR SERPLBLD CKD-EPI 2021: 56 ML/MIN/1.73M*2
EOSINOPHIL # BLD AUTO: 0.2 X10*3/UL (ref 0–0.7)
EOSINOPHIL NFR BLD AUTO: 4.1 %
ERYTHROCYTE [DISTWIDTH] IN BLOOD BY AUTOMATED COUNT: 14.9 % (ref 11.5–14.5)
GLUCOSE SERPL-MCNC: 94 MG/DL (ref 74–99)
HCT VFR BLD AUTO: 37.2 % (ref 36–46)
HDLC SERPL-MCNC: 48.8 MG/DL
HGB BLD-MCNC: 11.7 G/DL (ref 12–16)
IMM GRANULOCYTES # BLD AUTO: 0.02 X10*3/UL (ref 0–0.7)
IMM GRANULOCYTES NFR BLD AUTO: 0.4 % (ref 0–0.9)
LDLC SERPL CALC-MCNC: 103 MG/DL
LYMPHOCYTES # BLD AUTO: 1.38 X10*3/UL (ref 1.2–4.8)
LYMPHOCYTES NFR BLD AUTO: 28.1 %
MCH RBC QN AUTO: 27.6 PG (ref 26–34)
MCHC RBC AUTO-ENTMCNC: 31.5 G/DL (ref 32–36)
MCV RBC AUTO: 88 FL (ref 80–100)
MICROALBUMIN UR-MCNC: 20.6 MG/L
MICROALBUMIN/CREAT UR: 24.7 UG/MG CREAT
MONOCYTES # BLD AUTO: 0.73 X10*3/UL (ref 0.1–1)
MONOCYTES NFR BLD AUTO: 14.9 %
NEUTROPHILS # BLD AUTO: 2.52 X10*3/UL (ref 1.2–7.7)
NEUTROPHILS NFR BLD AUTO: 51.3 %
NON HDL CHOLESTEROL: 127 MG/DL (ref 0–149)
NRBC BLD-RTO: 0 /100 WBCS (ref 0–0)
PLATELET # BLD AUTO: 197 X10*3/UL (ref 150–450)
POTASSIUM SERPL-SCNC: 3.9 MMOL/L (ref 3.5–5.3)
PROT SERPL-MCNC: 7.4 G/DL (ref 6.4–8.2)
RBC # BLD AUTO: 4.24 X10*6/UL (ref 4–5.2)
SODIUM SERPL-SCNC: 140 MMOL/L (ref 136–145)
TRIGL SERPL-MCNC: 122 MG/DL (ref 0–149)
TSH SERPL-ACNC: 3.47 MIU/L (ref 0.44–3.98)
VLDL: 24 MG/DL (ref 0–40)
WBC # BLD AUTO: 4.9 X10*3/UL (ref 4.4–11.3)

## 2024-04-24 PROCEDURE — 80061 LIPID PANEL: CPT

## 2024-04-24 PROCEDURE — 82570 ASSAY OF URINE CREATININE: CPT

## 2024-04-24 PROCEDURE — 85025 COMPLETE CBC W/AUTO DIFF WBC: CPT

## 2024-04-24 PROCEDURE — 80053 COMPREHEN METABOLIC PANEL: CPT

## 2024-04-24 PROCEDURE — 84443 ASSAY THYROID STIM HORMONE: CPT

## 2024-04-24 PROCEDURE — 82306 VITAMIN D 25 HYDROXY: CPT

## 2024-04-24 PROCEDURE — 82043 UR ALBUMIN QUANTITATIVE: CPT

## 2024-04-24 PROCEDURE — 36415 COLL VENOUS BLD VENIPUNCTURE: CPT

## 2024-04-24 RX ORDER — CEVIMELINE HYDROCHLORIDE 30 MG/1
30 CAPSULE ORAL 3 TIMES DAILY
Qty: 90 CAPSULE | Refills: 5 | Status: SHIPPED | OUTPATIENT
Start: 2024-04-24

## 2024-04-25 ENCOUNTER — TELEPHONE (OUTPATIENT)
Dept: PRIMARY CARE | Facility: CLINIC | Age: 69
End: 2024-04-25
Payer: MEDICARE

## 2024-04-25 NOTE — TELEPHONE ENCOUNTER
----- Message from Marianela Hernandez MD sent at 4/24/2024  5:22 PM EDT -----  Please call.  I renewed cevimeline.  She should schedule follow-up appointment at her convenience.  Dr. Thomas

## 2024-04-26 ENCOUNTER — TELEPHONE (OUTPATIENT)
Dept: PRIMARY CARE | Facility: CLINIC | Age: 69
End: 2024-04-26
Payer: MEDICARE

## 2024-04-26 NOTE — TELEPHONE ENCOUNTER
T/c to pt, spoke with pt, advised pt of recent lab results, Mild anemia but stable, Kidney function stable, LDL cholesterol slightly above goal at 103.  Dr. Patel will review at follow-up in July and adjust treatment if needed, pt verbalized her understanding

## 2024-04-26 NOTE — TELEPHONE ENCOUNTER
----- Message from Mary Patel MD sent at 4/25/2024  6:14 PM EDT -----  Mild anemia but stable  Kidney function stable  LDL cholesterol slightly above goal at 103.  Will review at follow-up in July and adjust treatment if needed

## 2024-05-15 ENCOUNTER — INFUSION (OUTPATIENT)
Dept: INFUSION THERAPY | Facility: CLINIC | Age: 69
End: 2024-05-15
Payer: MEDICARE

## 2024-05-15 VITALS
DIASTOLIC BLOOD PRESSURE: 71 MMHG | SYSTOLIC BLOOD PRESSURE: 122 MMHG | RESPIRATION RATE: 16 BRPM | HEART RATE: 66 BPM | OXYGEN SATURATION: 96 % | TEMPERATURE: 97.6 F

## 2024-05-15 DIAGNOSIS — M81.0 OSTEOPOROSIS, UNSPECIFIED OSTEOPOROSIS TYPE, UNSPECIFIED PATHOLOGICAL FRACTURE PRESENCE: ICD-10-CM

## 2024-05-15 PROCEDURE — 96372 THER/PROPH/DIAG INJ SC/IM: CPT | Performed by: REGISTERED NURSE

## 2024-05-15 RX ORDER — DIPHENHYDRAMINE HYDROCHLORIDE 50 MG/ML
50 INJECTION INTRAMUSCULAR; INTRAVENOUS AS NEEDED
OUTPATIENT
Start: 2024-10-10

## 2024-05-15 RX ORDER — ALBUTEROL SULFATE 0.83 MG/ML
3 SOLUTION RESPIRATORY (INHALATION) AS NEEDED
OUTPATIENT
Start: 2024-10-10

## 2024-05-15 RX ORDER — FAMOTIDINE 10 MG/ML
20 INJECTION INTRAVENOUS ONCE AS NEEDED
OUTPATIENT
Start: 2024-10-10

## 2024-05-15 RX ORDER — EPINEPHRINE 0.3 MG/.3ML
0.3 INJECTION SUBCUTANEOUS EVERY 5 MIN PRN
OUTPATIENT
Start: 2024-10-10

## 2024-05-15 ASSESSMENT — ENCOUNTER SYMPTOMS
PSYCHIATRIC NEGATIVE: 1
GASTROINTESTINAL NEGATIVE: 1
MUSCULOSKELETAL NEGATIVE: 1
RESPIRATORY NEGATIVE: 1
CONSTITUTIONAL NEGATIVE: 1
CARDIOVASCULAR NEGATIVE: 1
EYES NEGATIVE: 1
NEUROLOGICAL NEGATIVE: 1
HEMATOLOGIC/LYMPHATIC NEGATIVE: 1
ENDOCRINE NEGATIVE: 1

## 2024-05-15 NOTE — PROGRESS NOTES
[facility]   Infusion Clinic Note   Date: May 15, 2024   Name: Loulou Hand  : 1955   MRN: [id number]         Reason for Visit: Injections (PATIENT HERE FOR HER PROLIA 60MG subcutaneous INJECTION Q6 MONTHS )         Visit Type: INJECTION       Ordered By: ELEAZAR LOWRY MD      Accompanied by:Self      Diagnosis: Osteoporosis, unspecified osteoporosis type, unspecified pathological fracture presence       Allergies:   Allergies as of 05/15/2024    (No Known Allergies)         Current Medications has a current medication list which includes the following prescription(s): hydroxychloroquine, amlodipine, atenolol, atorvastatin, calcium carb and citrate-vitd3, cevimeline, denosumab, duloxetine, gabapentin, gabapentin, and levothyroxine.       Vitals:   Vitals:    05/15/24 1349   BP: 122/71   Pulse: 66   Resp: 16   Temp: 36.4 °C (97.6 °F)   SpO2: 96%             Infusion Pre-procedure Checklist:   - Allergies reviewed: yes   - Medications reviewed: yes       - Previous reaction to current treatment: no      Assess patient for the concerns below. Document provider notification as appropriate.  - Active or recent infection with/without current antibiotic use: no  - Recent or planned invasive dental work: no  - Recent or planned surgeries: no  - Recently received or plans to receive vaccinations: no  - Has treatment related toxicities: no  - Is pregnant:  n/a      Pain: 0   - Is the pain different from normal: n/a   - Is the pain tolerable: n/a   - Is your Doctor aware:  n/a      Labs: N/A         Fall Risk Screening: Ortega Fall Risk  History of Falling, Immediate or Within 3 Months: No  Secondary Diagnosis: No  Ambulatory Aid: Walks without aid/bedrest/nurse assist  Intravenous Therapy/Heparin Lock: No  Gait/Transferring: Normal/bedrest/immobile  Mental Status: Oriented to own ability  Ortega Fall Risk Score: 0       Review Of Systems:  Review of Systems   Constitutional: Negative.     HENT:  Negative.     Eyes: Negative.    Respiratory: Negative.     Cardiovascular: Negative.    Gastrointestinal: Negative.    Endocrine: Negative.    Genitourinary: Negative.     Musculoskeletal: Negative.    Skin: Negative.    Neurological: Negative.    Hematological: Negative.    Psychiatric/Behavioral: Negative.           Infusion Readiness:   - Assessment Concerns Related to Infusion: No  - Provider notified: n/a      Document Below Only If Indicated:   New Patient Education:    N/A (returning patient for continuation of therapy. Ongoing education provided as needed.)        Treatment Conditions & Drug Specific Questions:    Denosumab  (PROLIA. XGEVA)    (Unless otherwise specified on patient specific therapy plan):     TREATMENT CONDITIONS:  Unless otherwise specified on patient specific therapy plan HOLD and notify provider prior to proceeding with today's injection if patients:  o Calcium is LESS THAN 8.6 mg/dL OR  Ionized Calcium LESS THAN 1.1 mmol/L  o Recent or planned invasive dental procedure (within 4 weeks)    Lab Results   Component Value Date    CALCIUM 9.4 04/24/2024      Lab Results   Component Value Date    CAION 1.28 03/14/2023       Labs reviewed and patient meets treatment conditions? Yes    DRUG SPECIFIC QUESTIONS:  Is the patient taking calcium and vitamin D? Yes  (Recommended)    Pt Instructed on following risks: (1) hypocalcemia, (2) osteonecrosis of the jaw, (3) atypical femoral fractures, (4) serious infections, and (5) dermatologic reactions?  Yes    Recommended Vitals/Observation:  Vitals: Obtain vitals prior to injection.  Observation: Patient may leave immediately following injection.        Weight Based Drug Calculations:    WEIGHT BASED DRUGS: NOT APPLICABLE / FLAT DOSE          Initiated By: Fatou Mathis RN   Time: 1:52 PM     We administered denosumab.

## 2024-05-15 NOTE — PATIENT INSTRUCTIONS
Today you received:  PROLIA 60MG subcutaneous INJECTION Q6 MONTHS      For:    1. Osteoporosis, unspecified osteoporosis type, unspecified pathological fracture presence         Please read the  Medication Guide that was given to you and reviewed during todays visit.     (Tell all doctors including dentists that you are taking this medication)     Go to the emergency room or call 911 if:  -You have signs of allergic reaction:   o         Rash, hives, itching.   o         Swollen, blistered, peeling skin.   o         Swelling of face, lips, mouth, tongue or throat.   o         Tightness of chest, trouble breathing, swallowing or talking      Call your doctor:     - If IV / injection site gets red, warm, swollen, itchy or leaks fluid or pus.     (Leave dressing on your IV site for at least 2 hours and keep area clean and dry    - If you get sick or have symptoms of infection or are not feeling well for any reason.    (Wash your hands often, stay away from people who are sick)    - If you have side effects from your medication that do not go away or are bothersome.     (Refer to the teaching your nurse gave you for side effects to call your doctor about)     Common side effects may include:  stuffy nose, headache, feeling tired, muscle aches, upset stomach    - Before receiving any vaccines, Call the [facility] at [phone]     - You get sick, are on antibiotics, have had a recent vaccine, have surgery or dental work and your doctor wants your visit rescheduled.    - You need to cancel and reschedule your visit for any reason. Call at least 2 days before your visit if you need to cancel.     - Your insurance changes before your next visit.    (We will need to get approval from your new insurance. This can take up to two weeks.)     The [facility] is opened Monday thru Friday 8am-8pm and Saturday 8am-4:30pm. We are closed on holidays.     Voice mail will take your call if the center  is closed. If you leave a message please allow 24 hours for a call back during weekdays. If you leave a message on a weekend/holiday, we will call you back the next business day.

## 2024-05-22 ENCOUNTER — TELEPHONE (OUTPATIENT)
Dept: PRIMARY CARE | Facility: CLINIC | Age: 69
End: 2024-05-22
Payer: MEDICARE

## 2024-05-22 NOTE — TELEPHONE ENCOUNTER
----- Message from Marianela Hernandez MD sent at 5/17/2024  3:47 PM EDT -----  Please call. I recommend that she make a follow up appointment by July (because the Prolia orders are coming from me).  Dr. Thmoas

## 2024-05-22 NOTE — TELEPHONE ENCOUNTER
Tried calling but the call did not go through, I also tried to leave a voice message but the voice mail box is full.

## 2024-07-06 DIAGNOSIS — M35.01 SJOGREN'S SYNDROME WITH KERATOCONJUNCTIVITIS SICCA (MULTI): ICD-10-CM

## 2024-07-08 RX ORDER — HYDROXYCHLOROQUINE SULFATE 200 MG/1
TABLET, FILM COATED ORAL DAILY
Qty: 30 TABLET | Refills: 0 | Status: SHIPPED | OUTPATIENT
Start: 2024-07-08

## 2024-07-10 ENCOUNTER — APPOINTMENT (OUTPATIENT)
Dept: PRIMARY CARE | Facility: CLINIC | Age: 69
End: 2024-07-10
Payer: MEDICARE

## 2024-07-10 VITALS
BODY MASS INDEX: 30.83 KG/M2 | DIASTOLIC BLOOD PRESSURE: 79 MMHG | SYSTOLIC BLOOD PRESSURE: 119 MMHG | HEIGHT: 63 IN | HEART RATE: 80 BPM | TEMPERATURE: 97.6 F | WEIGHT: 174 LBS

## 2024-07-10 DIAGNOSIS — E78.5 HYPERLIPIDEMIA, UNSPECIFIED HYPERLIPIDEMIA TYPE: ICD-10-CM

## 2024-07-10 DIAGNOSIS — M81.0 AGE-RELATED OSTEOPOROSIS WITHOUT CURRENT PATHOLOGICAL FRACTURE: ICD-10-CM

## 2024-07-10 DIAGNOSIS — I10 ESSENTIAL HYPERTENSION: ICD-10-CM

## 2024-07-10 DIAGNOSIS — K75.4 AUTOIMMUNE HEPATITIS (MULTI): ICD-10-CM

## 2024-07-10 DIAGNOSIS — Z12.83 SCREENING EXAM FOR SKIN CANCER: ICD-10-CM

## 2024-07-10 DIAGNOSIS — M35.00 SJOGREN'S SYNDROME, WITH UNSPECIFIED ORGAN INVOLVEMENT (MULTI): ICD-10-CM

## 2024-07-10 DIAGNOSIS — Z12.12 SCREENING FOR COLORECTAL CANCER: ICD-10-CM

## 2024-07-10 DIAGNOSIS — Z00.00 ROUTINE GENERAL MEDICAL EXAMINATION AT HEALTH CARE FACILITY: Primary | ICD-10-CM

## 2024-07-10 DIAGNOSIS — N18.31 STAGE 3A CHRONIC KIDNEY DISEASE (MULTI): ICD-10-CM

## 2024-07-10 DIAGNOSIS — F41.8 MIXED ANXIETY DEPRESSIVE DISORDER: ICD-10-CM

## 2024-07-10 DIAGNOSIS — E03.9 HYPOTHYROIDISM, UNSPECIFIED TYPE: ICD-10-CM

## 2024-07-10 DIAGNOSIS — Z12.11 SCREENING FOR COLORECTAL CANCER: ICD-10-CM

## 2024-07-10 DIAGNOSIS — I10 ESSENTIAL HYPERTENSION, BENIGN: ICD-10-CM

## 2024-07-10 DIAGNOSIS — Z23 NEED FOR VACCINATION: ICD-10-CM

## 2024-07-10 PROBLEM — E21.3 HYPERPARATHYROIDISM (MULTI): Status: RESOLVED | Noted: 2023-08-31 | Resolved: 2024-07-10

## 2024-07-10 PROCEDURE — 1123F ACP DISCUSS/DSCN MKR DOCD: CPT | Performed by: FAMILY MEDICINE

## 2024-07-10 PROCEDURE — 3078F DIAST BP <80 MM HG: CPT | Performed by: FAMILY MEDICINE

## 2024-07-10 PROCEDURE — G0439 PPPS, SUBSEQ VISIT: HCPCS | Performed by: FAMILY MEDICINE

## 2024-07-10 PROCEDURE — 99214 OFFICE O/P EST MOD 30 MIN: CPT | Performed by: FAMILY MEDICINE

## 2024-07-10 PROCEDURE — 3074F SYST BP LT 130 MM HG: CPT | Performed by: FAMILY MEDICINE

## 2024-07-10 PROCEDURE — 1159F MED LIST DOCD IN RCRD: CPT | Performed by: FAMILY MEDICINE

## 2024-07-10 PROCEDURE — 1170F FXNL STATUS ASSESSED: CPT | Performed by: FAMILY MEDICINE

## 2024-07-10 PROCEDURE — 1036F TOBACCO NON-USER: CPT | Performed by: FAMILY MEDICINE

## 2024-07-10 PROCEDURE — 1160F RVW MEDS BY RX/DR IN RCRD: CPT | Performed by: FAMILY MEDICINE

## 2024-07-10 RX ORDER — ATENOLOL 50 MG/1
75 TABLET ORAL DAILY
Qty: 135 TABLET | Refills: 1 | Status: SHIPPED | OUTPATIENT
Start: 2024-07-10

## 2024-07-10 RX ORDER — LEVOTHYROXINE SODIUM 25 UG/1
TABLET ORAL
Qty: 145 TABLET | Refills: 1 | Status: SHIPPED | OUTPATIENT
Start: 2024-07-10

## 2024-07-10 RX ORDER — ATORVASTATIN CALCIUM 40 MG/1
40 TABLET, FILM COATED ORAL DAILY
Qty: 90 TABLET | Refills: 1 | Status: SHIPPED | OUTPATIENT
Start: 2024-07-10

## 2024-07-10 RX ORDER — AMLODIPINE BESYLATE 10 MG/1
10 TABLET ORAL DAILY
Qty: 90 TABLET | Refills: 1 | Status: SHIPPED | OUTPATIENT
Start: 2024-07-10

## 2024-07-10 RX ORDER — DULOXETIN HYDROCHLORIDE 60 MG/1
60 CAPSULE, DELAYED RELEASE ORAL DAILY
Qty: 90 CAPSULE | Refills: 1 | Status: SHIPPED | OUTPATIENT
Start: 2024-07-10

## 2024-07-10 RX ORDER — VITAMIN E (DL,TOCOPHERYL ACET) 45 MG/0.25
1 DROPS ORAL 2 TIMES DAILY
Qty: 180 TABLET | Refills: 1 | Status: SHIPPED | OUTPATIENT
Start: 2024-07-10

## 2024-07-10 ASSESSMENT — ENCOUNTER SYMPTOMS
DIARRHEA: 0
DECREASED CONCENTRATION: 0
TROUBLE SWALLOWING: 0
CHEST TIGHTNESS: 0
BACK PAIN: 1
NERVOUS/ANXIOUS: 0
BLOOD IN STOOL: 0
ABDOMINAL PAIN: 0
FATIGUE: 0
DIZZINESS: 0
CONSTIPATION: 0
HEADACHES: 0
SHORTNESS OF BREATH: 0
PALPITATIONS: 0
ARTHRALGIAS: 1
APPETITE CHANGE: 0
SLEEP DISTURBANCE: 0

## 2024-07-10 ASSESSMENT — ACTIVITIES OF DAILY LIVING (ADL)
MANAGING_FINANCES: INDEPENDENT
TAKING_MEDICATION: INDEPENDENT
BATHING: INDEPENDENT
DRESSING: INDEPENDENT
GROCERY_SHOPPING: INDEPENDENT
DOING_HOUSEWORK: INDEPENDENT

## 2024-07-10 ASSESSMENT — PATIENT HEALTH QUESTIONNAIRE - PHQ9
SUM OF ALL RESPONSES TO PHQ9 QUESTIONS 1 AND 2: 0
2. FEELING DOWN, DEPRESSED OR HOPELESS: NOT AT ALL
1. LITTLE INTEREST OR PLEASURE IN DOING THINGS: NOT AT ALL

## 2024-07-10 NOTE — PROGRESS NOTES
Subjective   Reason for Visit: Loulou Hand is an 69 y.o. female here for a Medicare Wellness visit.     Past Medical, Surgical, and Family History reviewed and updated in chart.    Reviewed all medications by prescribing practitioner or clinical pharmacist (such as prescriptions, OTCs, herbal therapies and supplements) and documented in the medical record.    HPI  Here today for follow-up and medication management as well as Medicare wellness visit.  1.  Hypertension-stable on current regimen.  Denies headaches, lightheadedness, dizziness  2.  Anxiety and depression-stable on current dose of duloxetine.  Symptoms well-managed  3.  Hyperlipidemia-stable on statin.  Denies myalgia or GI related side effects  4.  Hypothyroidism-stable on current dose of levothyroxine.  Compliant with daily dosing  5.  History of autoimmune hepatitis-follows annually with gastroenterology/hepatology.  She has not completed hepatitis A or B vaccines as of yet.  6.  Sjogren's syndrome-follows with rheumatology.  On Plaquenil and cevelimine.   Patient Care Team:  Mary Patel MD as PCP - General  Mary Patel MD as PCP - [employer] PCP  Marianela Hernandez MD as Consulting Physician (Rheumatology)  Nikita Tee MD as Hepatologist (Hepatology)  Cameron Nails MD as Surgeon ([facility])     Review of Systems   Constitutional:  Negative for appetite change and fatigue.   HENT:  Negative for trouble swallowing.    Eyes:  Negative for visual disturbance.   Respiratory:  Negative for chest tightness and shortness of breath.    Cardiovascular:  Negative for chest pain and palpitations.   Gastrointestinal:  Negative for abdominal pain, blood in stool, constipation and diarrhea.   Musculoskeletal:  Positive for arthralgias and back pain.   Skin:  Negative for rash.   Neurological:  Negative for dizziness and headaches.   Psychiatric/Behavioral:  Negative for decreased concentration and sleep  "disturbance. The patient is not nervous/anxious.        Objective   Vitals:  /79 (BP Location: Left arm, Patient Position: Sitting)   Pulse 80   Temp 36.4 °C (97.6 °F)   Ht 1.6 m (5' 3\")   Wt 78.9 kg (174 lb)   BMI 30.82 kg/m²       Physical Exam  Vitals reviewed.   Constitutional:       General: She is not in acute distress.     Appearance: Normal appearance.   HENT:      Head: Normocephalic and atraumatic.      Right Ear: Tympanic membrane and ear canal normal.      Left Ear: Tympanic membrane and ear canal normal.      Nose: Nose normal. No congestion or rhinorrhea.      Mouth/Throat:      Mouth: Mucous membranes are moist.      Pharynx: Oropharynx is clear.      Comments: Edentulous  Eyes:      Extraocular Movements: Extraocular movements intact.      Conjunctiva/sclera: Conjunctivae normal.      Pupils: Pupils are equal, round, and reactive to light.   Cardiovascular:      Rate and Rhythm: Normal rate and regular rhythm.      Pulses: Normal pulses.      Heart sounds: Normal heart sounds. No murmur heard.  Pulmonary:      Effort: Pulmonary effort is normal. No respiratory distress.      Breath sounds: Normal breath sounds.   Abdominal:      General: Abdomen is flat. Bowel sounds are normal.      Palpations: Abdomen is soft.      Tenderness: There is no abdominal tenderness.   Musculoskeletal:         General: Normal range of motion.      Cervical back: Normal range of motion and neck supple.   Lymphadenopathy:      Cervical: No cervical adenopathy.   Skin:     General: Skin is warm and dry.   Neurological:      General: No focal deficit present.      Mental Status: She is alert and oriented to person, place, and time.   Psychiatric:         Mood and Affect: Mood normal.         Thought Content: Thought content normal.         Assessment/Plan   Problem List Items Addressed This Visit       Mixed anxiety depressive disorder    Relevant Medications    DULoxetine (Cymbalta) 60 mg DR capsule    Autoimmune " hepatitis (Multi)    Continue annual follow-up with hepatology.  Encouraged to proceed with otitis a and B vaccines.  Overview     Formatting of this note might be different from the original. We will monitor liver function Comment on above: Autoimmune hepatitis;         Current Assessment & Plan     Sees hepatology annually         Relevant Medications    hepatitis A (Vaqta) 50 unit/mL injection    hepatitis B virus vacc.rec,PF, (Recombivax hb) 10 mcg/mL syringe vaccine    Hyperlipidemia    Relevant Medications    atorvastatin (Lipitor) 40 mg tablet    Hypothyroidism    Post recent TSH within normal range.  Continue current low-dose of levothyroxine  Relevant Medications    levothyroxine (Synthroid, Levoxyl) 25 mcg tablet    Osteoporosis    Relevant Medications    calcium carb and citrate-vitD3 (Citracal-D3 Slow Release) 600 mg-12.5 mcg (500 unit) tablet extended release    Stage 3 chronic kidney disease (Multi)    Overview     Comment on above: Chronic kidney disease, stage III (moderate);  Chronic kidney disease, stage 3;         Current Assessment & Plan     stable         Sjogren's syndrome (Multi)    Overview     Comment on above: Sjogren's syndrome;         Current Assessment & Plan     Stable, followed by rheum          Other Visit Diagnoses       Routine general medical examination at health care facility    -  Primary  Proceed with mammogram as previously ordered  Encourage seasonal flu vaccine in the fall    Need for vaccination        Relevant Medications    zoster vaccine-recombinant adjuvanted (Shingrix) 50 mcg/0.5 mL vaccine    Screening for colorectal cancer        For follow-up colonoscopy.  Order entered.  Relevant Orders    Colonoscopy Screening; High Risk Patient    Essential hypertension        Relevant Medications    amLODIPine (Norvasc) 10 mg tablet    Essential hypertension, benign        BP stable and well-controlled.    Relevant Medications    atenolol (Tenormin) 50 mg tablet    Screening  exam for skin cancer        Relevant Orders    Referral to Dermatology

## 2024-07-10 NOTE — PROGRESS NOTES
Subjective   Patient ID: Loulou Hand is a 69 y.o. female who presents for Medicare Annual Wellness Visit Subsequent.  HPI    Review of Systems    Objective     Physical Exam     Assessment/Plan   Problem List Items Addressed This Visit    None

## 2024-07-16 ENCOUNTER — TELEPHONE (OUTPATIENT)
Dept: PRIMARY CARE | Facility: CLINIC | Age: 69
End: 2024-07-16
Payer: MEDICARE

## 2024-07-16 DIAGNOSIS — K75.4 AUTOIMMUNE HEPATITIS (MULTI): Primary | ICD-10-CM

## 2024-08-06 DIAGNOSIS — M35.01 SJOGREN'S SYNDROME WITH KERATOCONJUNCTIVITIS SICCA (MULTI): ICD-10-CM

## 2024-08-06 RX ORDER — HYDROXYCHLOROQUINE SULFATE 200 MG/1
TABLET, FILM COATED ORAL
Qty: 30 TABLET | Refills: 0 | Status: SHIPPED | OUTPATIENT
Start: 2024-08-06

## 2024-09-05 DIAGNOSIS — M35.01 SJOGREN'S SYNDROME WITH KERATOCONJUNCTIVITIS SICCA (MULTI): ICD-10-CM

## 2024-09-05 RX ORDER — HYDROXYCHLOROQUINE SULFATE 200 MG/1
TABLET, FILM COATED ORAL
Qty: 30 TABLET | Refills: 0 | Status: SHIPPED | OUTPATIENT
Start: 2024-09-05

## 2024-09-16 ENCOUNTER — APPOINTMENT (OUTPATIENT)
Dept: OPHTHALMOLOGY | Facility: CLINIC | Age: 69
End: 2024-09-16
Payer: MEDICARE

## 2024-10-08 ENCOUNTER — APPOINTMENT (OUTPATIENT)
Dept: RHEUMATOLOGY | Facility: CLINIC | Age: 69
End: 2024-10-08
Payer: MEDICARE

## 2024-10-22 DIAGNOSIS — M35.01 SJOGREN'S SYNDROME WITH KERATOCONJUNCTIVITIS SICCA (MULTI): ICD-10-CM

## 2024-10-22 RX ORDER — HYDROXYCHLOROQUINE SULFATE 200 MG/1
200 TABLET, FILM COATED ORAL DAILY
Qty: 30 TABLET | Refills: 0 | Status: SHIPPED | OUTPATIENT
Start: 2024-10-22 | End: 2024-11-21

## 2024-10-22 NOTE — TELEPHONE ENCOUNTER
Pt needs a refill for hydroxychloroquine - pt is completley out - pt is scheduled for Oct 29 - would be able to send in short script ? - please advise   Zi CUELLAR

## 2024-10-23 DIAGNOSIS — M81.0 OSTEOPOROSIS, UNSPECIFIED OSTEOPOROSIS TYPE, UNSPECIFIED PATHOLOGICAL FRACTURE PRESENCE: Primary | ICD-10-CM

## 2024-10-23 RX ORDER — ALBUTEROL SULFATE 0.83 MG/ML
3 SOLUTION RESPIRATORY (INHALATION) AS NEEDED
OUTPATIENT
Start: 2024-10-23

## 2024-10-23 RX ORDER — EPINEPHRINE 0.3 MG/.3ML
0.3 INJECTION SUBCUTANEOUS EVERY 5 MIN PRN
OUTPATIENT
Start: 2024-10-23

## 2024-10-23 RX ORDER — FAMOTIDINE 10 MG/ML
20 INJECTION INTRAVENOUS ONCE AS NEEDED
OUTPATIENT
Start: 2024-10-23

## 2024-10-23 RX ORDER — DIPHENHYDRAMINE HYDROCHLORIDE 50 MG/ML
50 INJECTION INTRAMUSCULAR; INTRAVENOUS AS NEEDED
OUTPATIENT
Start: 2024-10-23

## 2024-10-29 ENCOUNTER — LAB (OUTPATIENT)
Dept: LAB | Facility: LAB | Age: 69
End: 2024-10-29
Payer: MEDICARE

## 2024-10-29 ENCOUNTER — APPOINTMENT (OUTPATIENT)
Dept: RHEUMATOLOGY | Facility: CLINIC | Age: 69
End: 2024-10-29
Payer: MEDICARE

## 2024-10-29 VITALS
DIASTOLIC BLOOD PRESSURE: 80 MMHG | BODY MASS INDEX: 31.61 KG/M2 | TEMPERATURE: 98.1 F | OXYGEN SATURATION: 99 % | WEIGHT: 178.4 LBS | RESPIRATION RATE: 16 BRPM | HEIGHT: 63 IN | HEART RATE: 80 BPM | SYSTOLIC BLOOD PRESSURE: 118 MMHG

## 2024-10-29 DIAGNOSIS — M35.01 SJOGREN'S SYNDROME WITH KERATOCONJUNCTIVITIS SICCA (MULTI): ICD-10-CM

## 2024-10-29 DIAGNOSIS — M81.0 OSTEOPOROSIS, UNSPECIFIED OSTEOPOROSIS TYPE, UNSPECIFIED PATHOLOGICAL FRACTURE PRESENCE: Primary | ICD-10-CM

## 2024-10-29 DIAGNOSIS — M81.0 OSTEOPOROSIS, UNSPECIFIED OSTEOPOROSIS TYPE, UNSPECIFIED PATHOLOGICAL FRACTURE PRESENCE: ICD-10-CM

## 2024-10-29 DIAGNOSIS — M81.0 AGE-RELATED OSTEOPOROSIS WITHOUT CURRENT PATHOLOGICAL FRACTURE: Primary | ICD-10-CM

## 2024-10-29 LAB
ALBUMIN SERPL BCP-MCNC: 4 G/DL (ref 3.4–5)
ALP SERPL-CCNC: 70 U/L (ref 33–136)
ALT SERPL W P-5'-P-CCNC: 15 U/L (ref 7–45)
ANION GAP SERPL CALC-SCNC: 12 MMOL/L (ref 10–20)
AST SERPL W P-5'-P-CCNC: 27 U/L (ref 9–39)
BASOPHILS # BLD AUTO: 0.04 X10*3/UL (ref 0–0.1)
BASOPHILS NFR BLD AUTO: 0.8 %
BILIRUB SERPL-MCNC: 0.4 MG/DL (ref 0–1.2)
BUN SERPL-MCNC: 17 MG/DL (ref 6–23)
CALCIUM SERPL-MCNC: 9.4 MG/DL (ref 8.6–10.6)
CHLORIDE SERPL-SCNC: 102 MMOL/L (ref 98–107)
CO2 SERPL-SCNC: 28 MMOL/L (ref 21–32)
CREAT SERPL-MCNC: 1.25 MG/DL (ref 0.5–1.05)
EGFRCR SERPLBLD CKD-EPI 2021: 47 ML/MIN/1.73M*2
EOSINOPHIL # BLD AUTO: 0.13 X10*3/UL (ref 0–0.7)
EOSINOPHIL NFR BLD AUTO: 2.7 %
ERYTHROCYTE [DISTWIDTH] IN BLOOD BY AUTOMATED COUNT: 15.1 % (ref 11.5–14.5)
GLUCOSE SERPL-MCNC: 104 MG/DL (ref 74–99)
HCT VFR BLD AUTO: 37.9 % (ref 36–46)
HGB BLD-MCNC: 12.2 G/DL (ref 12–16)
IMM GRANULOCYTES # BLD AUTO: 0.01 X10*3/UL (ref 0–0.7)
IMM GRANULOCYTES NFR BLD AUTO: 0.2 % (ref 0–0.9)
LYMPHOCYTES # BLD AUTO: 1.1 X10*3/UL (ref 1.2–4.8)
LYMPHOCYTES NFR BLD AUTO: 23.2 %
MCH RBC QN AUTO: 28.5 PG (ref 26–34)
MCHC RBC AUTO-ENTMCNC: 32.2 G/DL (ref 32–36)
MCV RBC AUTO: 89 FL (ref 80–100)
MONOCYTES # BLD AUTO: 0.89 X10*3/UL (ref 0.1–1)
MONOCYTES NFR BLD AUTO: 18.7 %
NEUTROPHILS # BLD AUTO: 2.58 X10*3/UL (ref 1.2–7.7)
NEUTROPHILS NFR BLD AUTO: 54.4 %
NRBC BLD-RTO: 0 /100 WBCS (ref 0–0)
PLATELET # BLD AUTO: 261 X10*3/UL (ref 150–450)
POTASSIUM SERPL-SCNC: 4.3 MMOL/L (ref 3.5–5.3)
PROT SERPL-MCNC: 7.7 G/DL (ref 6.4–8.2)
RBC # BLD AUTO: 4.28 X10*6/UL (ref 4–5.2)
SODIUM SERPL-SCNC: 138 MMOL/L (ref 136–145)
WBC # BLD AUTO: 4.8 X10*3/UL (ref 4.4–11.3)

## 2024-10-29 PROCEDURE — 80053 COMPREHEN METABOLIC PANEL: CPT

## 2024-10-29 PROCEDURE — 3008F BODY MASS INDEX DOCD: CPT | Performed by: INTERNAL MEDICINE

## 2024-10-29 PROCEDURE — 1159F MED LIST DOCD IN RCRD: CPT | Performed by: INTERNAL MEDICINE

## 2024-10-29 PROCEDURE — 99214 OFFICE O/P EST MOD 30 MIN: CPT | Performed by: INTERNAL MEDICINE

## 2024-10-29 PROCEDURE — 3074F SYST BP LT 130 MM HG: CPT | Performed by: INTERNAL MEDICINE

## 2024-10-29 PROCEDURE — 3078F DIAST BP <80 MM HG: CPT | Performed by: INTERNAL MEDICINE

## 2024-10-29 PROCEDURE — 85025 COMPLETE CBC W/AUTO DIFF WBC: CPT

## 2024-10-29 PROCEDURE — 1123F ACP DISCUSS/DSCN MKR DOCD: CPT | Performed by: INTERNAL MEDICINE

## 2024-10-29 PROCEDURE — 1160F RVW MEDS BY RX/DR IN RCRD: CPT | Performed by: INTERNAL MEDICINE

## 2024-10-29 PROCEDURE — 36415 COLL VENOUS BLD VENIPUNCTURE: CPT

## 2024-10-29 RX ORDER — HYDROXYCHLOROQUINE SULFATE 200 MG/1
200 TABLET, FILM COATED ORAL DAILY
Qty: 90 TABLET | Refills: 1 | Status: SHIPPED | OUTPATIENT
Start: 2024-10-29 | End: 2025-04-27

## 2024-11-01 DIAGNOSIS — M35.01 SJOGREN SYNDROME WITH KERATOCONJUNCTIVITIS (MULTI): ICD-10-CM

## 2024-11-04 ENCOUNTER — APPOINTMENT (OUTPATIENT)
Dept: INFUSION THERAPY | Facility: CLINIC | Age: 69
End: 2024-11-04
Payer: MEDICARE

## 2024-11-04 RX ORDER — CEVIMELINE HYDROCHLORIDE 30 MG/1
30 CAPSULE ORAL 3 TIMES DAILY
Qty: 270 CAPSULE | Refills: 1 | Status: SHIPPED | OUTPATIENT
Start: 2024-11-04

## 2024-11-04 NOTE — TELEPHONE ENCOUNTER
Patient is requesting a refill on cevimeline 30mg     Last office visit: 10/29/2024

## 2024-11-05 ENCOUNTER — APPOINTMENT (OUTPATIENT)
Dept: INFUSION THERAPY | Facility: CLINIC | Age: 69
End: 2024-11-05
Payer: MEDICARE

## 2024-11-12 ENCOUNTER — INFUSION (OUTPATIENT)
Dept: INFUSION THERAPY | Facility: CLINIC | Age: 69
End: 2024-11-12
Payer: MEDICARE

## 2024-11-12 ENCOUNTER — APPOINTMENT (OUTPATIENT)
Dept: RHEUMATOLOGY | Facility: CLINIC | Age: 69
End: 2024-11-12
Payer: MEDICARE

## 2024-11-12 VITALS
DIASTOLIC BLOOD PRESSURE: 82 MMHG | SYSTOLIC BLOOD PRESSURE: 136 MMHG | TEMPERATURE: 97.7 F | OXYGEN SATURATION: 98 % | RESPIRATION RATE: 16 BRPM | HEART RATE: 78 BPM

## 2024-11-12 DIAGNOSIS — M81.0 OSTEOPOROSIS, UNSPECIFIED OSTEOPOROSIS TYPE, UNSPECIFIED PATHOLOGICAL FRACTURE PRESENCE: ICD-10-CM

## 2024-11-12 PROCEDURE — 96372 THER/PROPH/DIAG INJ SC/IM: CPT | Performed by: NURSE PRACTITIONER

## 2024-11-12 RX ORDER — DIPHENHYDRAMINE HYDROCHLORIDE 50 MG/ML
50 INJECTION INTRAMUSCULAR; INTRAVENOUS AS NEEDED
OUTPATIENT
Start: 2025-04-27

## 2024-11-12 RX ORDER — ALBUTEROL SULFATE 0.83 MG/ML
3 SOLUTION RESPIRATORY (INHALATION) AS NEEDED
OUTPATIENT
Start: 2025-04-27

## 2024-11-12 RX ORDER — FAMOTIDINE 10 MG/ML
20 INJECTION INTRAVENOUS ONCE AS NEEDED
OUTPATIENT
Start: 2025-04-27

## 2024-11-12 RX ORDER — EPINEPHRINE 0.3 MG/.3ML
0.3 INJECTION SUBCUTANEOUS EVERY 5 MIN PRN
OUTPATIENT
Start: 2025-04-27

## 2024-11-12 ASSESSMENT — ENCOUNTER SYMPTOMS
MUSCULOSKELETAL NEGATIVE: 1
CONSTITUTIONAL NEGATIVE: 1
NEUROLOGICAL NEGATIVE: 1
RESPIRATORY NEGATIVE: 1

## 2024-11-12 NOTE — PROGRESS NOTES
[facility]   Infusion Clinic Note   Date: 2024   Name: Loulou Hand  : 1955   MRN: [id number]          Reason for Visit: OP Infusion (Pt here for Q 6 month Prolia injection)         Today: We administered denosumab.       Visit Type: INJECTION       Ordered By:     Marianela Hernandez MD          Accompanied by:Self       Diagnosis: Osteoporosis, unspecified osteoporosis type, unspecified pathological fracture presence        Allergies:   Allergies as of 2024    (No Known Allergies)          Current Medications has a current medication list which includes the following prescription(s): cevimeline, amlodipine, atenolol, atorvastatin, calcium carb, citrate-vit d3, denosumab, duloxetine, gabapentin, hepatitis a and b vaccine (pf), hydroxychloroquine, levothyroxine, and zoster vaccine-recombinant adjuvanted.       Vitals:   Vitals:    24 1620   BP: 136/82   Pulse: 78   Resp: 16   Temp: 36.5 °C (97.7 °F)   SpO2: 98%             Infusion Pre-procedure Checklist:   - Allergies reviewed: yes   - Medications reviewed: yes       - Previous reaction to current treatment: n/a      Assess patient for the concerns below. Document provider notification as appropriate.  - Active or recent infection with/without current antibiotic use: no  - Recent or planned invasive dental work: no  - Recent or planned surgeries: no  - Recently received or plans to receive vaccinations: no  - Has treatment related toxicities: no  - Is pregnant:  n/a      Pain: 0   - Is the pain different from normal: no   - Is your Doctor aware:  no       Labs:  reviewed          Fall Risk Screening:         Review Of Systems:  Review of Systems   Constitutional: Negative.    Respiratory: Negative.     Musculoskeletal: Negative.    Neurological: Negative.          ROS completed? Yes      Infusion Readiness:  - Assessment Concerns Related to Infusion: No  - Provider notified: n/a      Document Below  Only If Indicated:   New Patient Education:    N/A (returning patient for continuation of therapy. Ongoing education provided as needed.)        Treatment Conditions & Drug Specific Questions:    Denosumab  (PROLIA. XGEVA)    (Unless otherwise specified on patient specific therapy plan):     TREATMENT CONDITIONS:  Unless otherwise specified on patient specific therapy plan HOLD and notify provider prior to proceeding with today's injection if patients:  o Calcium is LESS THAN 8.6 mg/dL OR  Ionized Calcium LESS THAN 1.1 mmol/L  o Recent or planned invasive dental procedure (within 4 weeks)    Lab Results   Component Value Date    CALCIUM 9.4 10/29/2024      Lab Results   Component Value Date    CAION 1.28 03/14/2023       Labs reviewed and patient meets treatment conditions? Yes    DRUG SPECIFIC QUESTIONS:  Is the patient taking calcium and vitamin D? Yes  (Recommended)    Pt Instructed on following risks: (1) hypocalcemia, (2) osteonecrosis of the jaw, (3) atypical femoral fractures, (4) serious infections, and (5) dermatologic reactions?  Yes      REMINDER:  PREGNANCY CATEGORY X DRUG. OBTAIN NEGTATIVE PREGNANCY TEST PRIOR TO FIRST INFUSION FOR WOMEN OF CHILDBEARING ABILITY   REMS DRUG    Recommended Vitals/Observation:  Vitals: Obtain vitals prior to injection.  Observation: Patient may leave immediately following injection.        Weight Based Drug Calculations:    WEIGHT BASED DRUGS: NOT APPLICABLE / FLAT DOSE          Initiated By: Lorenza Masters RN

## 2024-11-12 NOTE — PATIENT INSTRUCTIONS
Today :We administered denosumab.     For:   1. Osteoporosis, unspecified osteoporosis type, unspecified pathological fracture presence         Your next appointment is due in:  6 months        Please read the  Medication Guide that was given to you and reviewed during todays visit.     (Tell all doctors including dentists that you are taking this medication)     Go to the emergency room or call 911 if:  -You have signs of allergic reaction:   -Rash, hives, itching.   -Swollen, blistered, peeling skin.   -Swelling of face, lips, mouth, tongue or throat.   -Tightness of chest, trouble breathing, swallowing or talking     Call your doctor:  - If IV / injection site gets red, warm, swollen, itchy or leaks fluid or pus.     (Leave dressing on your IV site for at least 2 hours and keep area clean and dry  - If you get sick or have symptoms of infection or are not feeling well for any reason.    (Wash your hands often, stay away from people who are sick)  - If you have side effects from your medication that do not go away or are bothersome.     (Refer to the teaching your nurse gave you for side effects to call your doctor about)    - Common side effects may include:  stuffy nose, headache, feeling tired, muscle aches, upset stomach  - Before receiving any vaccines     - Call the [facility] at   If:  - You get sick, are on antibiotics, have had a recent vaccine, have surgery or dental work and your doctor wants your visit rescheduled.  - You need to cancel and reschedule your visit for any reason. Call at least 2 days before your visit if you need to cancel.   - Your insurance changes before your next visit.    (We will need to get approval from your new insurance. This can take up to two weeks.)     The [facility] is opened Monday thru Friday. We are closed on weekends and holidays.   Voice mail will take your call if the center is closed. If you leave a message please allow 24 hours for  a call back during weekdays. If you leave a message on a weekend/holiday, we will call you back the next business day.    A pharmacist is available Monday - Friday from 8:30AM to 3:30PM to help answer any questions you may have about your prescriptions(s). Please call pharmacy at:    [facility]: [phone]  [facility]: [phone]  [facility]: [phone]

## 2024-11-27 ENCOUNTER — APPOINTMENT (OUTPATIENT)
Dept: OPHTHALMOLOGY | Facility: CLINIC | Age: 69
End: 2024-11-27
Payer: MEDICARE

## 2024-12-02 ENCOUNTER — APPOINTMENT (OUTPATIENT)
Dept: OPHTHALMOLOGY | Facility: CLINIC | Age: 69
End: 2024-12-02
Payer: MEDICARE

## 2024-12-20 ENCOUNTER — TELEPHONE (OUTPATIENT)
Dept: PAIN MEDICINE | Facility: CLINIC | Age: 69
End: 2024-12-20
Payer: MEDICARE

## 2025-01-06 ENCOUNTER — APPOINTMENT (OUTPATIENT)
Dept: PAIN MEDICINE | Facility: CLINIC | Age: 70
End: 2025-01-06
Payer: MEDICARE

## 2025-01-07 ENCOUNTER — TELEPHONE (OUTPATIENT)
Dept: PAIN MEDICINE | Facility: CLINIC | Age: 70
End: 2025-01-07
Payer: MEDICARE

## 2025-01-10 DIAGNOSIS — F41.8 MIXED ANXIETY DEPRESSIVE DISORDER: ICD-10-CM

## 2025-01-14 RX ORDER — DULOXETIN HYDROCHLORIDE 60 MG/1
CAPSULE, DELAYED RELEASE ORAL
Qty: 90 CAPSULE | Refills: 1 | Status: SHIPPED | OUTPATIENT
Start: 2025-01-14

## 2025-02-05 ENCOUNTER — APPOINTMENT (OUTPATIENT)
Dept: PRIMARY CARE | Facility: CLINIC | Age: 70
End: 2025-02-05
Payer: MEDICARE

## 2025-02-05 DIAGNOSIS — E03.9 HYPOTHYROIDISM, UNSPECIFIED TYPE: ICD-10-CM

## 2025-02-05 DIAGNOSIS — Z23 NEED FOR VACCINATION: ICD-10-CM

## 2025-02-05 DIAGNOSIS — E78.5 HYPERLIPIDEMIA, UNSPECIFIED HYPERLIPIDEMIA TYPE: ICD-10-CM

## 2025-02-05 DIAGNOSIS — F41.8 MIXED ANXIETY DEPRESSIVE DISORDER: ICD-10-CM

## 2025-02-05 DIAGNOSIS — R20.2 PARESTHESIA OF BOTH LEGS: ICD-10-CM

## 2025-02-05 DIAGNOSIS — Z12.31 ENCOUNTER FOR SCREENING MAMMOGRAM FOR BREAST CANCER: ICD-10-CM

## 2025-02-05 DIAGNOSIS — I10 ESSENTIAL HYPERTENSION: ICD-10-CM

## 2025-02-05 DIAGNOSIS — Z00.00 ROUTINE GENERAL MEDICAL EXAMINATION AT HEALTH CARE FACILITY: Primary | ICD-10-CM

## 2025-02-05 DIAGNOSIS — I10 ESSENTIAL HYPERTENSION, BENIGN: ICD-10-CM

## 2025-02-05 PROCEDURE — G0439 PPPS, SUBSEQ VISIT: HCPCS | Performed by: FAMILY MEDICINE

## 2025-02-05 PROCEDURE — 1160F RVW MEDS BY RX/DR IN RCRD: CPT | Performed by: FAMILY MEDICINE

## 2025-02-05 PROCEDURE — 1159F MED LIST DOCD IN RCRD: CPT | Performed by: FAMILY MEDICINE

## 2025-02-05 PROCEDURE — 1123F ACP DISCUSS/DSCN MKR DOCD: CPT | Performed by: FAMILY MEDICINE

## 2025-02-05 PROCEDURE — 1158F ADVNC CARE PLAN TLK DOCD: CPT | Performed by: FAMILY MEDICINE

## 2025-02-05 PROCEDURE — 99214 OFFICE O/P EST MOD 30 MIN: CPT | Performed by: FAMILY MEDICINE

## 2025-02-05 PROCEDURE — 3074F SYST BP LT 130 MM HG: CPT | Performed by: FAMILY MEDICINE

## 2025-02-05 PROCEDURE — 3079F DIAST BP 80-89 MM HG: CPT | Performed by: FAMILY MEDICINE

## 2025-02-05 PROCEDURE — 1170F FXNL STATUS ASSESSED: CPT | Performed by: FAMILY MEDICINE

## 2025-02-05 PROCEDURE — 3008F BODY MASS INDEX DOCD: CPT | Performed by: FAMILY MEDICINE

## 2025-02-05 RX ORDER — HYDROXYZINE HYDROCHLORIDE 10 MG/1
10 TABLET, FILM COATED ORAL EVERY 8 HOURS PRN
Qty: 30 TABLET | Refills: 1 | Status: SHIPPED | OUTPATIENT
Start: 2025-02-05 | End: 2025-03-07

## 2025-02-05 RX ORDER — ATORVASTATIN CALCIUM 40 MG/1
40 TABLET, FILM COATED ORAL DAILY
Qty: 90 TABLET | Refills: 1 | Status: SHIPPED | OUTPATIENT
Start: 2025-02-05

## 2025-02-05 RX ORDER — LEVOTHYROXINE SODIUM 25 UG/1
TABLET ORAL
Qty: 145 TABLET | Refills: 1 | Status: SHIPPED | OUTPATIENT
Start: 2025-02-05

## 2025-02-05 RX ORDER — AMLODIPINE BESYLATE 10 MG/1
10 TABLET ORAL DAILY
Qty: 90 TABLET | Refills: 1 | Status: SHIPPED | OUTPATIENT
Start: 2025-02-05

## 2025-02-05 RX ORDER — ATENOLOL 50 MG/1
75 TABLET ORAL DAILY
Qty: 135 TABLET | Refills: 1 | Status: SHIPPED | OUTPATIENT
Start: 2025-02-05

## 2025-02-05 RX ORDER — DULOXETIN HYDROCHLORIDE 60 MG/1
60 CAPSULE, DELAYED RELEASE ORAL DAILY
Qty: 90 CAPSULE | Refills: 1 | Status: SHIPPED | OUTPATIENT
Start: 2025-02-05

## 2025-02-05 ASSESSMENT — ACTIVITIES OF DAILY LIVING (ADL)
GROCERY_SHOPPING: INDEPENDENT
DOING_HOUSEWORK: INDEPENDENT
MANAGING_FINANCES: INDEPENDENT
BATHING: INDEPENDENT
DRESSING: INDEPENDENT
TAKING_MEDICATION: INDEPENDENT

## 2025-02-05 ASSESSMENT — PATIENT HEALTH QUESTIONNAIRE - PHQ9
2. FEELING DOWN, DEPRESSED OR HOPELESS: NOT AT ALL
1. LITTLE INTEREST OR PLEASURE IN DOING THINGS: NOT AT ALL
SUM OF ALL RESPONSES TO PHQ9 QUESTIONS 1 AND 2: 0

## 2025-02-05 NOTE — ASSESSMENT & PLAN NOTE
Orders:    levothyroxine (Synthroid, Levoxyl) 25 mcg tablet; TAKE ONE TABLET BY MOUTH EVERY DAY ALTERNATING WITH TWO TABLETS    TSH with reflex to Free T4 if abnormal; Future

## 2025-02-05 NOTE — PROGRESS NOTES
"Subjective   Reason for Visit: Loulou Hand is an 70 y.o. female here for a Medicare Wellness visit.     Past Medical, Surgical, and Family History reviewed and updated in chart.    Reviewed all medications by prescribing practitioner or clinical pharmacist (such as prescriptions, OTCs, herbal therapies and supplements) and documented in the medical record.    HPI  Here for Medicare wellness visit and routine medication follow-up.  Hypertension-stable on current regimen of amlodipine plus atenolol.  Denies headaches, lightheadedness, dizziness.  Hyperlipidemia-stable on statin.  Denies chest pain, palpitations, dyspnea on exertion.  Hypothyroidism-consistent with levothyroxine dosing.  No unexplained weight changes.  No bowel changes.  Depression anxiety-stable on duloxetine with as needed use of hydroxyzine.  Paresthesias in both legs-frequent tingling sensation in both lower extremities.  Does not change with physical activity or position.  Patient Care Team:  Mary Patel MD as PCP - General  Mary Patel MD as PCP - [employer] Advantage PCP  Marianela Hernandez MD as Consulting Physician (Rheumatology)  Nikita Tee MD as Hepatologist (Hepatology)  Cameron Nails MD as Surgeon ([facility])     Review of Systems   Constitutional:  Negative for activity change, fatigue and unexpected weight change.   Respiratory:  Negative for chest tightness and shortness of breath.    Cardiovascular:  Negative for chest pain and palpitations.   Gastrointestinal:  Negative for abdominal pain, constipation and diarrhea.   Musculoskeletal:  Positive for arthralgias.   Neurological:  Negative for light-headedness and headaches.   Psychiatric/Behavioral:  Negative for dysphoric mood and sleep disturbance. The patient is not nervous/anxious.        Objective   Vitals:  /90 (BP Location: Right arm, Patient Position: Sitting)   Pulse 80   Temp 36.1 °C (96.9 °F)   Ht 1.6 m (5' 3\")   Wt 76.7 " kg (169 lb)   BMI 29.94 kg/m²       Physical Exam  Vitals reviewed.   Constitutional:       General: She is not in acute distress.     Appearance: Normal appearance. She is not ill-appearing.   HENT:      Head: Normocephalic and atraumatic.      Right Ear: Tympanic membrane and ear canal normal.      Left Ear: Tympanic membrane and ear canal normal.      Nose: Nose normal. No congestion or rhinorrhea.      Mouth/Throat:      Mouth: Mucous membranes are moist.      Dentition: Abnormal dentition.      Pharynx: Oropharynx is clear.   Eyes:      Extraocular Movements: Extraocular movements intact.      Conjunctiva/sclera: Conjunctivae normal.      Pupils: Pupils are equal, round, and reactive to light.   Cardiovascular:      Rate and Rhythm: Normal rate and regular rhythm.      Pulses: Normal pulses.      Heart sounds: Normal heart sounds. No murmur heard.  Pulmonary:      Effort: Pulmonary effort is normal. No respiratory distress.      Breath sounds: Normal breath sounds.   Abdominal:      General: Abdomen is flat. Bowel sounds are normal.      Palpations: Abdomen is soft.      Tenderness: There is no abdominal tenderness.   Musculoskeletal:         General: Normal range of motion.      Cervical back: Normal range of motion and neck supple.   Lymphadenopathy:      Cervical: No cervical adenopathy.   Skin:     General: Skin is warm and dry.   Neurological:      General: No focal deficit present.      Mental Status: She is alert and oriented to person, place, and time.      Sensory: Sensation is intact.      Motor: Motor function is intact.      Coordination: Coordination is intact.      Deep Tendon Reflexes:      Reflex Scores:       Patellar reflexes are 2+ on the right side and 2+ on the left side.       Achilles reflexes are 2+ on the right side and 2+ on the left side.  Psychiatric:         Mood and Affect: Mood normal.         Thought Content: Thought content normal.         Assessment & Plan  Routine general  medical examination at health care facility  Encouraged healthy food choices, monitor portions  Discussed strategies to increase physical activity incorporating strength and resistance exercises         Need for vaccination  Reviewed recommended vaccines  Orders:    diphth,pertus,acell,,tetanus (BoostRIX) 2.5-8-5 Lf-mcg-Lf/0.5mL injection; Inject 0.5 mL into the muscle 1 time for 1 dose.    zoster vaccine-recombinant adjuvanted (Shingrix) 50 mcg/0.5 mL vaccine; Inject 0.5 mL into the muscle 1 time for 1 dose.    Encounter for screening mammogram for breast cancer  Proceed with mammogram when due  Orders:    BI mammo bilateral screening tomosynthesis; Future    Essential hypertension  BP adequately controlled, continue current regimen  Orders:    amLODIPine (Norvasc) 10 mg tablet; Take 1 tablet (10 mg) by mouth once daily.    Albumin-Creatinine Ratio, Urine Random; Future    Lipid Panel; Future    Essential hypertension, benign    Orders:    atenolol (Tenormin) 50 mg tablet; Take 1.5 tablets (75 mg) by mouth once daily.    Lipid Panel; Future    Comprehensive Metabolic Panel; Future    Hyperlipidemia, unspecified hyperlipidemia type  Continue statin, check LFTs and lipids  Orders:    atorvastatin (Lipitor) 40 mg tablet; Take 1 tablet (40 mg) by mouth once daily.    Lipid Panel; Future    Comprehensive Metabolic Panel; Future    Mixed anxiety depressive disorder  Continue duloxetine  Orders:    hydrOXYzine HCL (Atarax) 10 mg tablet; Take 1 tablet (10 mg) by mouth every 8 hours if needed for anxiety.    DULoxetine (Cymbalta) 60 mg DR capsule; Take 1 capsule (60 mg) by mouth once daily. Do not crush or chew.    Hypothyroidism, unspecified type    Orders:    levothyroxine (Synthroid, Levoxyl) 25 mcg tablet; TAKE ONE TABLET BY MOUTH EVERY DAY ALTERNATING WITH TWO TABLETS    TSH with reflex to Free T4 if abnormal; Future    Paresthesia of both legs  Check B12 and folic acid to rule out metabolic etiology of  paresthesias  Orders:    CBC and Auto Differential; Future    Vitamin B12; Future    Folate; Future            Patient was identified as a fall risk. Risk prevention instructions provided.

## 2025-02-05 NOTE — ASSESSMENT & PLAN NOTE
Continue duloxetine  Orders:    hydrOXYzine HCL (Atarax) 10 mg tablet; Take 1 tablet (10 mg) by mouth every 8 hours if needed for anxiety.    DULoxetine (Cymbalta) 60 mg DR capsule; Take 1 capsule (60 mg) by mouth once daily. Do not crush or chew.

## 2025-02-05 NOTE — ASSESSMENT & PLAN NOTE
Continue statin, check LFTs and lipids  Orders:    atorvastatin (Lipitor) 40 mg tablet; Take 1 tablet (40 mg) by mouth once daily.    Lipid Panel; Future    Comprehensive Metabolic Panel; Future

## 2025-02-09 VITALS
BODY MASS INDEX: 29.95 KG/M2 | SYSTOLIC BLOOD PRESSURE: 122 MMHG | HEART RATE: 80 BPM | TEMPERATURE: 96.9 F | WEIGHT: 169 LBS | DIASTOLIC BLOOD PRESSURE: 80 MMHG | HEIGHT: 63 IN

## 2025-02-09 ASSESSMENT — ENCOUNTER SYMPTOMS
UNEXPECTED WEIGHT CHANGE: 0
ABDOMINAL PAIN: 0
DIARRHEA: 0
ACTIVITY CHANGE: 0
FATIGUE: 0
ARTHRALGIAS: 1
CHEST TIGHTNESS: 0
SLEEP DISTURBANCE: 0
DYSPHORIC MOOD: 0
NERVOUS/ANXIOUS: 0
SHORTNESS OF BREATH: 0
HEADACHES: 0
LIGHT-HEADEDNESS: 0
PALPITATIONS: 0
CONSTIPATION: 0

## 2025-02-11 ENCOUNTER — OFFICE VISIT (OUTPATIENT)
Dept: PAIN MEDICINE | Facility: CLINIC | Age: 70
End: 2025-02-11
Payer: MEDICARE

## 2025-02-11 VITALS — HEART RATE: 74 BPM | TEMPERATURE: 97.8 F | DIASTOLIC BLOOD PRESSURE: 76 MMHG | SYSTOLIC BLOOD PRESSURE: 126 MMHG

## 2025-02-11 DIAGNOSIS — G89.29 CHRONIC MIDLINE LOW BACK PAIN, UNSPECIFIED WHETHER SCIATICA PRESENT: Primary | ICD-10-CM

## 2025-02-11 DIAGNOSIS — M54.50 CHRONIC MIDLINE LOW BACK PAIN, UNSPECIFIED WHETHER SCIATICA PRESENT: Primary | ICD-10-CM

## 2025-02-11 PROCEDURE — 3074F SYST BP LT 130 MM HG: CPT | Performed by: NURSE PRACTITIONER

## 2025-02-11 PROCEDURE — 99213 OFFICE O/P EST LOW 20 MIN: CPT | Performed by: NURSE PRACTITIONER

## 2025-02-11 PROCEDURE — 1123F ACP DISCUSS/DSCN MKR DOCD: CPT | Performed by: NURSE PRACTITIONER

## 2025-02-11 PROCEDURE — 1125F AMNT PAIN NOTED PAIN PRSNT: CPT | Performed by: NURSE PRACTITIONER

## 2025-02-11 PROCEDURE — 3078F DIAST BP <80 MM HG: CPT | Performed by: NURSE PRACTITIONER

## 2025-02-11 RX ORDER — GABAPENTIN 600 MG/1
600 TABLET ORAL 4 TIMES DAILY
Qty: 120 TABLET | Refills: 2 | Status: SHIPPED | OUTPATIENT
Start: 2025-02-11 | End: 2025-05-12

## 2025-02-11 ASSESSMENT — PAIN DESCRIPTION - DESCRIPTORS: DESCRIPTORS: ACHING

## 2025-02-11 ASSESSMENT — PAIN SCALES - GENERAL: PAINLEVEL_OUTOF10: 3

## 2025-02-11 ASSESSMENT — PAIN - FUNCTIONAL ASSESSMENT: PAIN_FUNCTIONAL_ASSESSMENT: 0-10

## 2025-02-11 NOTE — H&P
History Of Present Illness  Loulou Hand is a 70 y.o. female presenting for refill of medications.  She is known in this clinic because of chronic back pain. She is maintained on Gabapentin 600 mg TID, confirms today that they are still moderately beneficial in controlling his back and leg pains. Her level of pain today is about 3/10 while sitting, states that on prolonged standing, walking gives her a lot of discomfort on both  legs. OARRS obtained and reviewed, no abuse or misuse with prescribed medication noted.  She is still independent, maintains a regular job.     Past Medical History  Past Medical History:   Diagnosis Date    Age-related nuclear cataract, bilateral 09/14/2022    Cataract, nuclear sclerotic, both eyes    Encounter for other screening for malignant neoplasm of breast     Breast cancer screening    Encounter for screening for malignant neoplasm of cervix     Cervical cancer screening    Hyperlipidemia, unspecified 03/07/2022    Hyperlipidemia    Hypothyroidism, unspecified 10/03/2016    Hypothyroidism    Long term (current) use of systemic steroids     Current chronic use of systemic steroids    Meibomian gland dysfunction of unspecified eye, unspecified eyelid 09/14/2022    MGD (meibomian gland disease)    Myopia, unspecified eye 09/14/2022    Myopia with astigmatism and presbyopia    Nontoxic diffuse goiter     Simple goiter    Other long term (current) drug therapy 09/15/2018    Long-term use of Plaquenil    Other long term (current) drug therapy 01/23/2017    Long-term use of Plaquenil    Other specified abnormal immunological findings in serum 03/07/2022    PEDRO positive    Pain in unspecified joint 10/03/2016    Arthralgia    Personal history of other diseases of the nervous system and sense organs     History of impacted cerumen    Personal history of other diseases of the nervous system and sense organs     History of conjunctivitis    Personal history of other diseases of the nervous  system and sense organs     History of retinal detachment    Personal history of other diseases of the respiratory system     History of sore throat    Retinal detachment 06/17/2022    Serous retinal detachment, right eye 09/14/2022    Retinal detachment, right    Sjogren syndrome with keratoconjunctivitis (Multi) 09/14/2022    Sicca syndrome with keratoconjunctivitis    Unspecified cataract 02/06/2017    Cataract of right eye    Unspecified cataract 02/06/2017    Cataract of left eye    Unspecified cataract 02/06/2017    Cataract of right eye    Unspecified cataract 02/06/2017    Cataract of left eye    Unspecified osteoarthritis, unspecified site 11/16/2022    Osteoarthritis       Surgical History  Past Surgical History:   Procedure Laterality Date    OTHER SURGICAL HISTORY  04/13/2022    Knee arthroscopy    OTHER SURGICAL HISTORY  03/20/2022    Biopsy Of Liver    OTHER SURGICAL HISTORY  07/06/2022    Transurethral resection of bladder tumor    OTHER SURGICAL HISTORY  08/01/2022    Knee replacement    OTHER SURGICAL HISTORY  03/03/2022    Complete colonoscopy    OTHER SURGICAL HISTORY  11/19/2021    Surgery    OTHER SURGICAL HISTORY  11/19/2021    Cataract surgery    US GUIDED NEEDLE LIVER BIOPSY  9/14/2020    US GUIDED NEEDLE LIVER BIOPSY 9/14/2020 [facility]        Social History  She reports that she has never smoked. She has never used smokeless tobacco. She reports that she does not currently use alcohol. She reports that she does not currently use drugs.    Family History  Family History   Problem Relation Name Age of Onset    Other (CARDIAC DISORDER) Mother      Heart attack Mother      Arthritis Father      Other (CARDIAC DISORDER) Father      Other (SUBSTANCE ABUSE) Father      Heart attack Sister      Heart attack Brother  42        Allergies  Patient has no known allergies.    Review of Systems   Review of systems x 10 is negative.   Reports of a fall about 2 weeks ago, was able to get up with  help and walk on own.   No recent change in medical condition reported.   No recent weakness reported.   Still able to control bowel and bladder function.  Denies any problem with constipation.   Denies fever, cough, shortness of breath recently.   No interval change with medication/health issues reported.  Denies opioids diversion and abuse. Denies overuse of pain medications.    Physical Exam  Awake,alert, no acute distress, appropriate.  Spine is of normal curvature.  Full ROM on all 4 extremities, sensation and motor intact, no vascular compromise.  No pedal edema, normal gait.  Skin warm, dry, intact, turgor is normal.  Denies any numbness, tingling.  Occasional aching to both legs.   Last Recorded Vitals  Blood pressure 126/76, pulse 74, temperature 36.6 °C (97.8 °F).    Relevant Results  No recent imaging noted.     Assessment/Plan     70 years old, female, with history and physical examination supportive of chronic back pain with lumbar degenerative disc disease and lumbar spondylolisthesis currently maintained on Gabapentin.    I have personally reviewed the OARRS report for this patient. I have considered the risk of abuse, dependence, addiction and diversion.  I believe that it is clinically appropriate for this patient  to be prescribed this medication based on documented diagnosis.  Based on the favorable effect in your quality of life and the positive effect in the ability to do daily activities of daily living, I feel feel that the benefits outweighs the risk, and I will continue the current regimen of medications.  Will increase the Gabapentin to 600 mg QiD, for both legs discomfort.  Also advised to take OTC Magnesium daily to help with leg discomfort.  Follow up in 3 months time or as needed basis  Explained plan to this patient, and patient verbalized understanding and agreement with the plan. If there is questions or concerns, please feel free to contact me to clarify at [phone], M-F 8-4  PM.         I spent 25 minutes in the professional and overall care of this patient.      Rosa Rodriguez, APRN-CNP

## 2025-02-20 ENCOUNTER — OFFICE VISIT (OUTPATIENT)
Dept: PRIMARY CARE | Facility: CLINIC | Age: 70
End: 2025-02-20
Payer: MEDICARE

## 2025-02-20 VITALS
HEIGHT: 63 IN | BODY MASS INDEX: 30.48 KG/M2 | TEMPERATURE: 97.3 F | SYSTOLIC BLOOD PRESSURE: 114 MMHG | DIASTOLIC BLOOD PRESSURE: 78 MMHG | HEART RATE: 82 BPM | WEIGHT: 172 LBS

## 2025-02-20 DIAGNOSIS — R26.89 BALANCE DISORDER: ICD-10-CM

## 2025-02-20 DIAGNOSIS — Z59.86 FINANCIAL INSECURITY: ICD-10-CM

## 2025-02-20 DIAGNOSIS — N18.31 STAGE 3A CHRONIC KIDNEY DISEASE (MULTI): ICD-10-CM

## 2025-02-20 DIAGNOSIS — Z23 IMMUNIZATION DUE: ICD-10-CM

## 2025-02-20 DIAGNOSIS — M51.362 DEGENERATION OF INTERVERTEBRAL DISC OF LUMBAR REGION WITH DISCOGENIC BACK PAIN AND LOWER EXTREMITY PAIN: ICD-10-CM

## 2025-02-20 DIAGNOSIS — M54.17 LUMBOSACRAL RADICULITIS: ICD-10-CM

## 2025-02-20 DIAGNOSIS — I10 BENIGN ESSENTIAL HYPERTENSION: ICD-10-CM

## 2025-02-20 DIAGNOSIS — Z91.81 HISTORY OF RECENT FALL: Primary | ICD-10-CM

## 2025-02-20 PROBLEM — R82.81 PYURIA: Status: RESOLVED | Noted: 2023-08-31 | Resolved: 2025-02-20

## 2025-02-20 PROBLEM — E66.3 OVERWEIGHT: Status: RESOLVED | Noted: 2023-08-31 | Resolved: 2025-02-20

## 2025-02-20 PROBLEM — I95.9 LOW BLOOD PRESSURE: Status: RESOLVED | Noted: 2023-08-31 | Resolved: 2025-02-20

## 2025-02-20 PROCEDURE — G2211 COMPLEX E/M VISIT ADD ON: HCPCS | Performed by: FAMILY MEDICINE

## 2025-02-20 PROCEDURE — 3074F SYST BP LT 130 MM HG: CPT | Performed by: FAMILY MEDICINE

## 2025-02-20 PROCEDURE — 3078F DIAST BP <80 MM HG: CPT | Performed by: FAMILY MEDICINE

## 2025-02-20 PROCEDURE — 1158F ADVNC CARE PLAN TLK DOCD: CPT | Performed by: FAMILY MEDICINE

## 2025-02-20 PROCEDURE — 3008F BODY MASS INDEX DOCD: CPT | Performed by: FAMILY MEDICINE

## 2025-02-20 PROCEDURE — 93000 ELECTROCARDIOGRAM COMPLETE: CPT | Performed by: FAMILY MEDICINE

## 2025-02-20 PROCEDURE — 1123F ACP DISCUSS/DSCN MKR DOCD: CPT | Performed by: FAMILY MEDICINE

## 2025-02-20 PROCEDURE — 99214 OFFICE O/P EST MOD 30 MIN: CPT | Performed by: FAMILY MEDICINE

## 2025-02-20 PROCEDURE — 1160F RVW MEDS BY RX/DR IN RCRD: CPT | Performed by: FAMILY MEDICINE

## 2025-02-20 PROCEDURE — 1036F TOBACCO NON-USER: CPT | Performed by: FAMILY MEDICINE

## 2025-02-20 PROCEDURE — 1159F MED LIST DOCD IN RCRD: CPT | Performed by: FAMILY MEDICINE

## 2025-02-20 SDOH — ECONOMIC STABILITY - INCOME SECURITY: FINANCIAL INSECURITY: Z59.86

## 2025-02-20 NOTE — PROGRESS NOTES
Subjective   Patient ID: Loulou Hand is a 70 y.o. female who presents for Follow-up (Follow up [place] er 2/16/Also discuss falling, possible assisted living. ).    HPI   Here today, accompanied by her daughter for follow-up after recent ER visit and concerns about frequent falls.  Seen in ER on Sunday due to back pain.  This was precipitated by 2 recent falls.  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  Felt like her legs gave way underneath her, fell backwards.  More recent fall occurred last Friday at work.  Again, sensation that her legs gave out underneath her.  No head injury or loss of consciousness.  No associated bowel or bladder incontinence.  Continues to have frequent low back pain and follows with pain management..  Reports sensation of feeling unsteady on feet.  Cautious with ambulation due to pain, concerns for falls.  Admits that she does not frequently use her walker or cane at home and is unable to use them at work.  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  Reports that she needs this job for financial security as she is unable to manage her current expenses on her Social Security check alone.  Her daughters concerned about her ability to continue to safely live independently and to continue her current job.    Review of Systems   Constitutional:  Positive for activity change and fatigue. Negative for unexpected weight change.   Respiratory:  Negative for chest tightness and shortness of breath.    Cardiovascular:  Negative for chest pain, palpitations and leg swelling.   Gastrointestinal:  Negative for abdominal pain.   Musculoskeletal:  Positive for arthralgias, back pain and gait problem. Negative for joint swelling and myalgias.   Neurological:  Positive for weakness. Negative for dizziness, syncope, light-headedness, numbness and headaches.   Psychiatric/Behavioral:  Negative for dysphoric mood and sleep disturbance.        Objective   /78 (BP Location:  "Right arm, Patient Position: Sitting)   Pulse 82   Temp 36.3 °C (97.3 °F)   Ht 1.6 m (5' 3\")   Wt 78 kg (172 lb)   BMI 30.47 kg/m²     Physical Exam  Vitals reviewed.   Constitutional:       General: She is not in acute distress.     Appearance: Normal appearance. She is obese. She is not ill-appearing or toxic-appearing.   HENT:      Head: Normocephalic and atraumatic.      Right Ear: Hearing, tympanic membrane and ear canal normal.      Left Ear: Hearing, tympanic membrane and ear canal normal.      Nose: Nose normal.      Mouth/Throat:      Comments: edentulous  Neck:      Thyroid: No thyroid mass or thyroid tenderness.   Cardiovascular:      Rate and Rhythm: Normal rate and regular rhythm.      Pulses: Normal pulses.      Heart sounds: Normal heart sounds. No murmur heard.  Pulmonary:      Effort: Pulmonary effort is normal. No respiratory distress.      Breath sounds: Normal breath sounds. No wheezing.   Musculoskeletal:      Cervical back: Neck supple.      Lumbar back: Tenderness present. No swelling, deformity or bony tenderness. Negative right straight leg raise test and negative left straight leg raise test.        Back:       Right lower leg: No edema.      Left lower leg: No edema.   Lymphadenopathy:      Cervical: No cervical adenopathy.   Neurological:      Mental Status: She is alert.      Motor: Weakness present.      Comments: Bilaterally, left greater than right with flexion       Viewed recent ED note, CT lumbar spine report  Assessment/Plan   Assessment & Plan  History of recent fall  EKG done at today's visit in light of recent falls and negative for arrhythmia.  QTc interval mildly prolonged.  Discussed this likely is adverse effect of Plaquenil.  Recommend repeat EKG if there is any future dose adjustment of her Plaquenil.  Recommend consultation with  to ensure safety at home and discuss alternate living solutions  Encouraged use of walker or cane at home.  Check with " employer to see if accommodations to use assistive device to minimize fall risk  Orders:    ECG 12 Lead    Referral to [place] [employer]    Lumbosacral radiculitis  Recommend PT  Recommend she review recent lumbar spine imaging with her pain management doctor as she may need consultation with neurosurgeon or orthopedics  Orders:    Referral to Physical Therapy; Future    Balance disorder    Orders:    Referral to Physical Therapy; Future    Immunization due  Reviewed recommended vaccines.  Given previous history of autoimmune hepatitis, again reviewed recommendation for hepatitis A and B vaccines.  Discussed Shingrix vaccination and Tdap booster.       Degeneration of intervertebral disc of lumbar region with discogenic back pain and lower extremity pain         Benign essential hypertension  Blood pressure stable upon today's measurement  Orders:    ECG 12 Lead    Referral to [place] [employer]    Financial insecurity    Orders:    Referral to [place] [employer]    Stage 3a chronic kidney disease (Multi)  Continue to monitor kidney function, avoid NSAIDs  Orders:    Referral to [facility]           Patient was identified as a fall risk. Risk prevention instructions provided.

## 2025-02-21 ENCOUNTER — DOCUMENTATION (OUTPATIENT)
Dept: CARE COORDINATION | Facility: CLINIC | Age: 70
End: 2025-02-21
Payer: MEDICARE

## 2025-02-21 NOTE — PROGRESS NOTES
Patient referred to [facility] for housing:  Pt was transferred to Delia Kerns/Community Health Worker for Housing Assistance.    is not opened with this case at this time.  Ame Roy MSW LSW

## 2025-02-23 ASSESSMENT — ENCOUNTER SYMPTOMS
CHEST TIGHTNESS: 0
HEADACHES: 0
NUMBNESS: 0
PALPITATIONS: 0
SHORTNESS OF BREATH: 0
DYSPHORIC MOOD: 0
SLEEP DISTURBANCE: 0
MYALGIAS: 0
DIZZINESS: 0
WEAKNESS: 1
BACK PAIN: 1
LIGHT-HEADEDNESS: 0
ABDOMINAL PAIN: 0
ARTHRALGIAS: 1
ACTIVITY CHANGE: 1
FATIGUE: 1
UNEXPECTED WEIGHT CHANGE: 0
JOINT SWELLING: 0

## 2025-02-23 NOTE — ASSESSMENT & PLAN NOTE
Recommend PT  Recommend she review recent lumbar spine imaging with her pain management doctor as she may need consultation with neurosurgeon or orthopedics  Orders:    Referral to Physical Therapy; Future

## 2025-02-24 ENCOUNTER — DOCUMENTATION (OUTPATIENT)
Dept: CARE COORDINATION | Facility: CLINIC | Age: 70
End: 2025-02-24
Payer: MEDICARE

## 2025-02-24 NOTE — PROGRESS NOTES
This SW received a message from renetta/Leona from main SW office line. Renetta/Leona would like to be contacted regarding mother's concerns. Dtr was called this morning by this SW and left a VM stating that Delia Kerns/Community Health Worker is assisting this patient at this time and that this SW will inform Delia to contact patient's daughter for concerns.  NOTE: This SW's contact info was left with daughter too. This SW is not opened with this case at this time due to CHW being referred.   NOTE: Pt was in need of housing resources.   NOTE: Secure Chat sent to Delia Kerns regarding contacting the daughter/Leona.    Ame Roy MSW LSW

## 2025-02-25 ENCOUNTER — PATIENT OUTREACH (OUTPATIENT)
Dept: CARE COORDINATION | Facility: CLINIC | Age: 70
End: 2025-02-25
Payer: MEDICARE

## 2025-02-25 SDOH — ECONOMIC STABILITY: FOOD INSECURITY
ARE ANY OF YOUR NEEDS URGENT? FOR EXAMPLE, UNCERTAINTY OF WHERE YOU WILL GET YOUR NEXT MEAL OR NOT HAVING THE MEDICATIONS YOU NEED TO TAKE TOMORROW.: NO

## 2025-02-25 SDOH — ECONOMIC STABILITY: INCOME INSECURITY: IN THE LAST 12 MONTHS, WAS THERE A TIME WHEN YOU WERE NOT ABLE TO PAY THE MORTGAGE OR RENT ON TIME?: PATIENT DECLINED

## 2025-02-25 SDOH — ECONOMIC STABILITY: FOOD INSECURITY: WITHIN THE PAST 12 MONTHS, YOU WORRIED THAT YOUR FOOD WOULD RUN OUT BEFORE YOU GOT MONEY TO BUY MORE.: PATIENT DECLINED

## 2025-02-25 SDOH — ECONOMIC STABILITY: GENERAL: WOULD YOU LIKE HELP WITH ANY OF THE FOLLOWING NEEDS?: FOOD INSECURITY;OTHER

## 2025-02-25 SDOH — ECONOMIC STABILITY: FOOD INSECURITY: WITHIN THE PAST 12 MONTHS, THE FOOD YOU BOUGHT JUST DIDN'T LAST AND YOU DIDN'T HAVE MONEY TO GET MORE.: PATIENT DECLINED

## 2025-02-25 NOTE — PROGRESS NOTES
Patient was referred to [facility] SW/Pt was transferred to CHW/Delia Kerns for assistance. Delia will still be assisting.   Dtr-Leona has more questions and more assistance needs for this patient (SW received VM on 2.24.25) therefore this SW has opened case now to assist with any questions and or needs that the daughter has at this time.  This Patient is being assisted by this SW and MANAVW/Delia for any community resources.  SW will speak with dtr to complete more assessments for needs in the community.     Ame Roy MSW LSW

## 2025-02-25 NOTE — PROGRESS NOTES
Spoke with patient daughter about resources, daughter stated they are interested in a HHA to assist patient at 2 or 3 days out the work to assist with meals/ laundry. No detail information was shared with daughter.     3:40P Chw spoke with patient to get permission to speak with daughter to share detail informatio. Patient gave permission, right now she does not feel the need to have a Home Health Aide to assist her at this time. Chw gave contact # for future assitance

## 2025-02-25 NOTE — PROGRESS NOTES
spoke with patient's dtr/Leona.   Leona shared that the patient is working with [facility]. That agency is assessing patient for Medicaid Waiver Asst. Living for patient. Patient still works part time.   Leona needed some resources for assistance for the patient:  1. Laundry 2. Grocery 3. Meals.  This SW provided: [facility] DSAS ([phone]-maybe able to provide assistance/resources)  and the GUIDE PROGRAM ([employer] [place] has a program called the Guide Program/will help with other local resouces: [phone]-the patient has to have some memory issues/does not have to have a formal diagnosis) to the Dtr for more resources/assistance.   Also, Delia Kerns was updated with the above info and will contact the dtr with more resources at this time.   At this time, this SW assisted on an episodic basis and will not stay opened. CHW/Delia Kerns will contact dtr.     Ame Roy MSW LSW

## 2025-02-26 ENCOUNTER — OFFICE VISIT (OUTPATIENT)
Dept: PAIN MEDICINE | Facility: CLINIC | Age: 70
End: 2025-02-26
Payer: MEDICARE

## 2025-02-26 ENCOUNTER — APPOINTMENT (OUTPATIENT)
Dept: PRIMARY CARE | Facility: CLINIC | Age: 70
End: 2025-02-26
Payer: MEDICARE

## 2025-02-26 DIAGNOSIS — M48.062 PSEUDOCLAUDICATION SYNDROME: Primary | ICD-10-CM

## 2025-02-26 PROCEDURE — 1159F MED LIST DOCD IN RCRD: CPT | Performed by: PAIN MEDICINE

## 2025-02-26 PROCEDURE — G2211 COMPLEX E/M VISIT ADD ON: HCPCS | Performed by: PAIN MEDICINE

## 2025-02-26 PROCEDURE — 99214 OFFICE O/P EST MOD 30 MIN: CPT | Performed by: PAIN MEDICINE

## 2025-02-26 PROCEDURE — 1123F ACP DISCUSS/DSCN MKR DOCD: CPT | Performed by: PAIN MEDICINE

## 2025-02-26 PROCEDURE — 1125F AMNT PAIN NOTED PAIN PRSNT: CPT | Performed by: PAIN MEDICINE

## 2025-02-26 ASSESSMENT — COLUMBIA-SUICIDE SEVERITY RATING SCALE - C-SSRS
1. IN THE PAST MONTH, HAVE YOU WISHED YOU WERE DEAD OR WISHED YOU COULD GO TO SLEEP AND NOT WAKE UP?: NO
2. HAVE YOU ACTUALLY HAD ANY THOUGHTS OF KILLING YOURSELF?: NO
6. HAVE YOU EVER DONE ANYTHING, STARTED TO DO ANYTHING, OR PREPARED TO DO ANYTHING TO END YOUR LIFE?: NO

## 2025-02-26 ASSESSMENT — PAIN - FUNCTIONAL ASSESSMENT: PAIN_FUNCTIONAL_ASSESSMENT: 0-10

## 2025-02-26 ASSESSMENT — PAIN SCALES - GENERAL: PAINLEVEL_OUTOF10: 4

## 2025-02-26 ASSESSMENT — PAIN DESCRIPTION - DESCRIPTORS: DESCRIPTORS: DULL;ACHING;THROBBING

## 2025-02-26 NOTE — PROGRESS NOTES
Had a fall and was seen in ER. Having terrible pain in upper right side back by shoulder. But patient says her chronic pain in lower back is worse

## 2025-02-26 NOTE — H&P
History Of Present Illness  Loulou Hand is a 70 y.o. female presenting   Had a fall and was seen in ER. Having terrible pain in upper right side back by shoulder. But patient says her chronic pain in lower back is worse   Currently describing the pain worsening during standing up and walking and alleviated during sitting rating the pain during sitting around 4 out of 10 and then standing up and walking around 8 out of 10 she had multiple falls that require a visit to the emergency department a new CT scan of the lumbar spine area was reviewed she had prior epidural steroid injection with her last injection in December 2022 and she had positive response to the epidural in the past currently have reoccurrence of her symptoms.  Improvement with the epidurals in the past at 80th percentile improvement she is currently on the gabapentin 600 mg 3 times per day she completed multiple sessions of physical therapy without any significant improvement advised not to continue with any nonsteroidal anti-inflammatory secondary to her kidney function.  Pain in the back as a toothache sensation     Past Medical History  Past Medical History:   Diagnosis Date    Age-related nuclear cataract, bilateral 09/14/2022    Cataract, nuclear sclerotic, both eyes    Encounter for other screening for malignant neoplasm of breast     Breast cancer screening    Encounter for screening for malignant neoplasm of cervix     Cervical cancer screening    Hyperlipidemia, unspecified 03/07/2022    Hyperlipidemia    Hypothyroidism, unspecified 10/03/2016    Hypothyroidism    Long term (current) use of systemic steroids     Current chronic use of systemic steroids    Meibomian gland dysfunction of unspecified eye, unspecified eyelid 09/14/2022    MGD (meibomian gland disease)    Myopia, unspecified eye 09/14/2022    Myopia with astigmatism and presbyopia    Nontoxic diffuse goiter     Simple goiter    Other long term (current) drug therapy 09/15/2018     Long-term use of Plaquenil    Other long term (current) drug therapy 01/23/2017    Long-term use of Plaquenil    Other specified abnormal immunological findings in serum 03/07/2022    PEDRO positive    Pain in unspecified joint 10/03/2016    Arthralgia    Personal history of other diseases of the nervous system and sense organs     History of impacted cerumen    Personal history of other diseases of the nervous system and sense organs     History of conjunctivitis    Personal history of other diseases of the nervous system and sense organs     History of retinal detachment    Personal history of other diseases of the respiratory system     History of sore throat    Retinal detachment 06/17/2022    Serous retinal detachment, right eye 09/14/2022    Retinal detachment, right    Sjogren syndrome with keratoconjunctivitis (Multi) 09/14/2022    Sicca syndrome with keratoconjunctivitis    Unspecified cataract 02/06/2017    Cataract of right eye    Unspecified cataract 02/06/2017    Cataract of left eye    Unspecified cataract 02/06/2017    Cataract of right eye    Unspecified cataract 02/06/2017    Cataract of left eye    Unspecified osteoarthritis, unspecified site 11/16/2022    Osteoarthritis     Surgical History  Past Surgical History:   Procedure Laterality Date    OTHER SURGICAL HISTORY  04/13/2022    Knee arthroscopy    OTHER SURGICAL HISTORY  03/20/2022    Biopsy Of Liver    OTHER SURGICAL HISTORY  07/06/2022    Transurethral resection of bladder tumor    OTHER SURGICAL HISTORY  08/01/2022    Knee replacement    OTHER SURGICAL HISTORY  03/03/2022    Complete colonoscopy    OTHER SURGICAL HISTORY  11/19/2021    Surgery    OTHER SURGICAL HISTORY  11/19/2021    Cataract surgery    US GUIDED NEEDLE LIVER BIOPSY  9/14/2020    US GUIDED NEEDLE LIVER BIOPSY 9/14/2020 [facility]     Social History  She reports that she has never smoked. She has never used smokeless tobacco. She reports that she does not currently  use alcohol. She reports that she does not currently use drugs.    Family History  Family History   Problem Relation Name Age of Onset    Other (CARDIAC DISORDER) Mother      Heart attack Mother      Arthritis Father      Other (CARDIAC DISORDER) Father      Other (SUBSTANCE ABUSE) Father      Heart attack Sister      Heart attack Brother  42        Allergies  No Known Allergies  Review of Systems   All 13 systems were reviewed and are within normal levels except as noted below or per HPI. Positive and pertinent negative responses are noted below or in the HPI   Denied any fever or chills. No weight loss and no night sweats. No cough or sputum production. No diarrhea   No constipation  No bladder and bowel incontinence and no other changes in bladder and bowel. No skin changes.   Denied opioids diversion and abuse and denies alcoholism. Denies overuse of  pain medications.    Physical Exam       Past medical history no interval changes has been noted    On physical examination    General   Alert, oriented x3 pleasant and cooperative. Does not look in any major distress.    HEENT  Pupils normal in size. Ears, nose, mouth, and throat appear to be in normal condition.  Head atraumatic      No signs of sedation or signs of withdrawal apparent.    Psychiatric   No signs of depression apparent.    Neuro   No focal neurological deficit apparent. Ambulation with a walker       Respiratory  No respiratory distress     Abdomen  no distention     Skin  No skin markings supportive of recent IV drug usage .    Cardiovascular  Regular rate and rhythm     Last Recorded Vitals  There were no vitals taken for this visit.  CT lumbar spine wo IV contrast    Result Date: 2/16/2025  * * *Final Report* * * DATE OF EXAM: Feb 16 2025  4:56PM   [facility]   [id number]  -  CT LUMBAR SPINE WO IVCON  / ACCESSION #  [id number] PROCEDURE REASON: Low back pain, trauma      * * * * Physician Interpretation * * * *  EXAMINATION:  CT LUMBAR SPINE WO IVCON  CLINICAL HISTORY:  Low back pain, trauma TECHNIQUE:  Spiral, high resolution axial unenhanced images were obtained from the thoracolumbar junction to the sacrum with sagittal and coronal planar reconstructions.  MQ:  [id number] CT Radiation dose: Integrated Dose-Length Product (DLP) for this visit =   831 mGy*cm. CT Dose Reduction Employed: Iterative recon and mAs-kVp adjusted using patient size-age COMPARISON: 11/25/2021 RESULT: Counting reference:  Lumbosacral junction.  For the purposes of this report,  L4-5 is considered the level of the iliac crest and assume there are 5 lumbar-type vertebrae.  Anatomic variant:  None.  (topogram) images:  No additional findings. Alignment:  Grade 1-2 degenerative anterolisthesis L5-S1.  Grade 1 degenerative anterolisthesis L4-5.  Minimal grade 1 retrolisthesis L2-3 and L3-4. Bone marrow /fracture:  No evidence of a lytic or blastic process in the visualized spine.  No evidence of acute or chronic fracture. Paraspinal soft tissues:  The paraspinal soft tissues planes are maintained.  Calcified uterine fibroid. Lower thoracic spine:  The visualized lower thoracic bony canal and foramina are patent. L1-L2:    Disc bulging with endplate osteophytes.  Mild narrowing of the spinal canal and right neural foramen. L2-L3:    Disc bulging with endplate osteophytes and facet hypertrophy.   Mild narrowing of the spinal canal and neural foramina. L3-L4:    Disc height loss and bulging with endplate osteophytes and facet hypertrophy.  Mild narrowing of the spinal canal and neural foramina. L4-L5:    Facet hypertrophy with degenerative anterolisthesis, disc height loss since 2 to bulging with endplate osteophytes.  Mild-moderate narrowing of the spinal canal with moderate right and mild left neural foraminal narrowing. L5-S1:    Facet hypertrophy with degenerative anterolisthesis and disc height loss/2 to bulging.  Mild narrowing of the spinal canal with severe subarticular zone  encroachment and severe bilateral neural foraminal stenosis. Sacrum and iliac wings:  The visualized sacrum and iliac wings are within normal limits.    IMPRESSION: Spondylosis as outlined noting multilevel degenerative listhesis, high-grade subarticular zone and foraminal narrowing at L5-S1, and up to mild-moderate narrowing of the spinal canal at L4-5, progressed from 11/25/2021. Anatomic Lumbar Variant: None.  L4-5 is considered the level of the iliac crest and assume there are 5 lumbar-type vertebrae. : PSC[facility]   Transcribe Date/Time: Feb 16 2025  4:59P Dictated by : AUDREY TAMEZ MD This examination was interpreted and the report reviewed and electronically signed by: AUDREY TAMEZ MD on Feb 16 2025  5:07PM  EST     Assessment/Plan   70 years old with history and physical examination supportive of lumbar stenosis tried and failed conservative management with physical therapy nonsteroidal anti-inflammatory not indicated secondary to her poor kidney function    Plan  Advised the patient that she will benefit from repeating the lumbar epidural steroid injection to be performed under fluoroscopic guidance targeting the L4-L5 or the L5-S1 level with the injection of contrast material to confirm needle placement I will reevaluate the patient after the performance of the epidural for further recommendation as her case progress benefits and risk of the epidural were discussed and she would like to proceed      The above clinical summary has been dictated with voice recognition software. It has not been proofread for grammatical errors, typographical mistakes, or other semantic inconsistencies.    Thank you for visiting our office today. It was our pleasure to take part in your healthcare.     Please do not hesitate to contact the pain clinic after your visit with any questions or concerns at  M-F 8-4 pm       Taylor Parnell M.D.  Medical Director , Division of Pain Medicine [facility]  [facility]   of Anesthesiology and Pain Medicine  [facility] [employer]     [address]  [place] [address]     Office: [phone]  Fax: [phone]      Taylor Parnell MD

## 2025-02-27 LAB
ALBUMIN SERPL-MCNC: 4 G/DL (ref 3.6–5.1)
ALP SERPL-CCNC: 61 U/L (ref 37–153)
ALT SERPL-CCNC: 12 U/L (ref 6–29)
ANION GAP SERPL CALCULATED.4IONS-SCNC: 8 MMOL/L (CALC) (ref 7–17)
AST SERPL-CCNC: 20 U/L (ref 10–35)
BASOPHILS # BLD AUTO: 61 CELLS/UL (ref 0–200)
BASOPHILS NFR BLD AUTO: 1.3 %
BILIRUB SERPL-MCNC: 0.4 MG/DL (ref 0.2–1.2)
BUN SERPL-MCNC: 16 MG/DL (ref 7–25)
CALCIUM SERPL-MCNC: 9.1 MG/DL (ref 8.6–10.4)
CHLORIDE SERPL-SCNC: 107 MMOL/L (ref 98–110)
CHOLEST SERPL-MCNC: 185 MG/DL
CHOLEST/HDLC SERPL: 3.2 (CALC)
CO2 SERPL-SCNC: 25 MMOL/L (ref 20–32)
CREAT SERPL-MCNC: 0.99 MG/DL (ref 0.6–1)
EGFRCR SERPLBLD CKD-EPI 2021: 61 ML/MIN/1.73M2
EOSINOPHIL # BLD AUTO: 212 CELLS/UL (ref 15–500)
EOSINOPHIL NFR BLD AUTO: 4.5 %
ERYTHROCYTE [DISTWIDTH] IN BLOOD BY AUTOMATED COUNT: 13.8 % (ref 11–15)
FOLATE SERPL-MCNC: 7.4 NG/ML
GLUCOSE SERPL-MCNC: 96 MG/DL (ref 65–99)
HCT VFR BLD AUTO: 39.1 % (ref 35–45)
HDLC SERPL-MCNC: 57 MG/DL
HGB BLD-MCNC: 12.5 G/DL (ref 11.7–15.5)
LDLC SERPL CALC-MCNC: 103 MG/DL (CALC)
LYMPHOCYTES # BLD AUTO: 1128 CELLS/UL (ref 850–3900)
LYMPHOCYTES NFR BLD AUTO: 24 %
MCH RBC QN AUTO: 28.9 PG (ref 27–33)
MCHC RBC AUTO-ENTMCNC: 32 G/DL (ref 32–36)
MCV RBC AUTO: 90.5 FL (ref 80–100)
MONOCYTES # BLD AUTO: 526 CELLS/UL (ref 200–950)
MONOCYTES NFR BLD AUTO: 11.2 %
NEUTROPHILS # BLD AUTO: 2773 CELLS/UL (ref 1500–7800)
NEUTROPHILS NFR BLD AUTO: 59 %
NONHDLC SERPL-MCNC: 128 MG/DL (CALC)
PLATELET # BLD AUTO: 224 THOUSAND/UL (ref 140–400)
PMV BLD REES-ECKER: 10.6 FL (ref 7.5–12.5)
POTASSIUM SERPL-SCNC: 4.3 MMOL/L (ref 3.5–5.3)
PROT SERPL-MCNC: 7.5 G/DL (ref 6.1–8.1)
RBC # BLD AUTO: 4.32 MILLION/UL (ref 3.8–5.1)
SODIUM SERPL-SCNC: 140 MMOL/L (ref 135–146)
T4 FREE SERPL-MCNC: 1 NG/DL (ref 0.8–1.8)
TRIGL SERPL-MCNC: 148 MG/DL
TSH SERPL-ACNC: 7.52 MIU/L (ref 0.4–4.5)
VIT B12 SERPL-MCNC: 401 PG/ML (ref 200–1100)
WBC # BLD AUTO: 4.7 THOUSAND/UL (ref 3.8–10.8)

## 2025-03-04 ENCOUNTER — APPOINTMENT (OUTPATIENT)
Dept: PAIN MEDICINE | Facility: CLINIC | Age: 70
End: 2025-03-04
Payer: MEDICARE

## 2025-03-05 ENCOUNTER — APPOINTMENT (OUTPATIENT)
Dept: OPHTHALMOLOGY | Facility: CLINIC | Age: 70
End: 2025-03-05
Payer: MEDICARE

## 2025-03-06 ENCOUNTER — HOSPITAL ENCOUNTER (OUTPATIENT)
Dept: RADIOLOGY | Facility: CLINIC | Age: 70
Discharge: HOME | End: 2025-03-06
Payer: MEDICARE

## 2025-03-06 VITALS — WEIGHT: 172 LBS | HEIGHT: 63 IN | BODY MASS INDEX: 30.48 KG/M2

## 2025-03-06 DIAGNOSIS — Z12.31 ENCOUNTER FOR SCREENING MAMMOGRAM FOR MALIGNANT NEOPLASM OF BREAST: ICD-10-CM

## 2025-03-06 PROCEDURE — 77067 SCR MAMMO BI INCL CAD: CPT

## 2025-03-10 DIAGNOSIS — R92.1 BREAST CALCIFICATION, RIGHT: Primary | ICD-10-CM

## 2025-03-12 ENCOUNTER — APPOINTMENT (OUTPATIENT)
Dept: PAIN MEDICINE | Facility: CLINIC | Age: 70
End: 2025-03-12
Payer: MEDICARE

## 2025-03-17 ENCOUNTER — TELEPHONE (OUTPATIENT)
Dept: PAIN MEDICINE | Facility: CLINIC | Age: 70
End: 2025-03-17

## 2025-03-25 ENCOUNTER — HOSPITAL ENCOUNTER (OUTPATIENT)
Dept: PAIN MEDICINE | Facility: CLINIC | Age: 70
Discharge: HOME | End: 2025-03-25
Payer: MEDICARE

## 2025-03-25 ENCOUNTER — APPOINTMENT (OUTPATIENT)
Dept: PAIN MEDICINE | Facility: CLINIC | Age: 70
End: 2025-03-25
Payer: MEDICARE

## 2025-03-25 VITALS
RESPIRATION RATE: 18 BRPM | TEMPERATURE: 97.5 F | SYSTOLIC BLOOD PRESSURE: 140 MMHG | DIASTOLIC BLOOD PRESSURE: 74 MMHG | OXYGEN SATURATION: 97 % | HEART RATE: 74 BPM

## 2025-03-25 DIAGNOSIS — M48.062 PSEUDOCLAUDICATION SYNDROME: ICD-10-CM

## 2025-03-25 PROCEDURE — 62323 NJX INTERLAMINAR LMBR/SAC: CPT | Performed by: PAIN MEDICINE

## 2025-03-25 ASSESSMENT — PAIN SCALES - GENERAL: PAINLEVEL_OUTOF10: 4

## 2025-03-25 ASSESSMENT — PAIN - FUNCTIONAL ASSESSMENT: PAIN_FUNCTIONAL_ASSESSMENT: 0-10

## 2025-03-25 ASSESSMENT — PAIN DESCRIPTION - DESCRIPTORS: DESCRIPTORS: ACHING

## 2025-03-25 NOTE — DISCHARGE INSTRUCTIONS
Post-injection instructions:    Your pain may not be gone immediately after the procedure--it usually takes the steroid 3-5 days to start working.   It may take several weeks for the medicine to reach its' full effect.   Pay attention to how much pain relief (what percentage compared to before the procedure) you get and for how long it lasts.     Activity: Avoid strenuous activity for 24 hours. After that return to your normal activity level.     Bandages: Remove after 24 hours     Showering/Bathing: You may shower after bandage is removed     Follow up: CALL OFFICE IN 7 DAYS [phone] LEAVE MESSAGE ABOUT THE RELIEF THAT WAS OBTAINED      Call the doctor immediately: if you notice:     Excessive bleeding from procedure site (brisk bright red bleeding from the site or bleeding that soaks the bandages or does not stop)   Severe headache  Inability to walk, leg or arm weakness or numbness that is worse after the procedure   Uncontrolled pain   New urinary or fecal incontinence   Signs of infection: Fever above 101.5F, redness, swelling, pus or drainage from the site    Epidural Injection    Why is this procedure done?  With an epidural injection, the doctor injects drugs deep into the area around your spinal cord. This is different than epidural anesthesia that is used for surgery or when a woman has a baby. Your spine is a group of bones in your back that protect the nerves in your spinal cord. Problems with your spine can cause swollen nerves in the spinal cord. This swelling leads to pain and can limit movement. In an epidural injection, the doctor may give you a drug to help with swelling and pain.  You may have an epidural injection in different parts of your back, based on where your pain is. For pain in your head or arms, you may have a cervical epidural injection. If your pain is in your upper or middle back, you may get a thoracic epidural injection. For pain in your lower back or legs, you may get a  lumbar epidural injection.    What will the results be?  The treatment may:  Lower pain  Reduce swelling in the nerves  Improve movement  What happens before the procedure?  Your doctor will take your history. Talk to the doctor about:  All the drugs you are taking. Be sure to include all prescription and over-the-counter (OTC) drugs, and herbal supplements. Tell the doctor about any drug allergy. Bring a list of drugs you take with you.  If you have high blood sugar or diabetes. Your drugs may need to be changed.  Any bleeding problems. Be sure to tell your doctor if you are taking any drugs that may cause bleeding. Some of these are warfarin, rivaroxaban, apixaban, ticagrelor, clopidogrel, ketorolac, ibuprofen, naproxen, or aspirin. Certain vitamins and herbs, such as garlic and fish oil, may also add to the risk for bleeding. You may need to stop these drugs as well. Talk to your doctor about them.  Tell the doctor if you are pregnant.  You will not be allowed to drive right away after the procedure. Ask a family member or a friend to drive you home.  What happens during the procedure?  To help the doctor make sure the drugs are being injected in the right place, your doctor may do an x-ray of your spine. Other times your doctor may do a continuous x-ray during the procedure. This is a fluoroscopy. The doctor may also use a colored dye or a contrast dye to check where to inject the drug.  You may be given a drug to help you relax. You may be given a drug to make the area of the injection numb.  The doctor will clean the skin on your back or neck. This helps prevent infection.  The doctor will put a needle through the skin toward your spine. The drug will be injected into a space near the spine.  The needle will be taken out and a bandage will be placed over the injection site.  What happens after the procedure?  Staff will check on you to make sure you are doing well. They will tell you when you can go  home.  What care is needed at home?  Relax on the day of the injection.  Do not drive or run machines for at least 12 hours afterwards.  Apply ice to the injection site. Place an ice pack or a bag of frozen peas wrapped in a towel over the painful part. Never put ice right on the skin. Do not leave the ice on more than 10 to 15 minutes at a time.  It may take a few days before you will feel the effects of the injection.  What follow-up care is needed?  Your doctor may ask you to make visits to the office to check on your progress. Be sure to keep these visits. You may also need to see a physical therapist (PT). The PT will teach you exercises to help you get back your strength and motion. Ask your doctor when you can exercise.  What problems could happen?  Bleeding  Infection (rare)  Headache  Nerve injury  If you have diabetes, your blood sugar can go up after the injection. Check with your doctor if you need more treatment for this.  Last Reviewed Date

## 2025-03-26 ENCOUNTER — HOSPITAL ENCOUNTER (OUTPATIENT)
Dept: RADIOLOGY | Facility: CLINIC | Age: 70
End: 2025-03-26
Payer: MEDICARE

## 2025-03-26 ENCOUNTER — APPOINTMENT (OUTPATIENT)
Dept: RADIOLOGY | Facility: CLINIC | Age: 70
End: 2025-03-26
Payer: MEDICARE

## 2025-04-02 ENCOUNTER — TELEPHONE (OUTPATIENT)
Dept: PAIN MEDICINE | Facility: CLINIC | Age: 70
End: 2025-04-02
Payer: MEDICARE

## 2025-04-02 NOTE — TELEPHONE ENCOUNTER
CALLS IN AND REPORTS MARKED IMPROVEMENT FROM THE PAIN AT LEAST 80% IMPROVEMENT WITH THE PAIN AFTER THE EPIDURAL

## 2025-04-09 ENCOUNTER — APPOINTMENT (OUTPATIENT)
Dept: OPHTHALMOLOGY | Facility: CLINIC | Age: 70
End: 2025-04-09
Payer: MEDICARE

## 2025-04-09 DIAGNOSIS — Z79.899 ENCOUNTER FOR LONG-TERM (CURRENT) USE OF HIGH-RISK MEDICATION: Primary | ICD-10-CM

## 2025-04-09 PROCEDURE — 99213 OFFICE O/P EST LOW 20 MIN: CPT | Performed by: OPHTHALMOLOGY

## 2025-04-09 PROCEDURE — 92250 FUNDUS PHOTOGRAPHY W/I&R: CPT | Performed by: OPHTHALMOLOGY

## 2025-04-09 ASSESSMENT — REFRACTION_WEARINGRX
OD_CYLINDER: -1.00
OS_ADD: +2.50
OS_CYLINDER: -1.25
OD_AXIS: 040
OD_ADD: +2.50
OD_SPHERE: -0.75
OS_SPHERE: -0.50
OS_AXIS: 090

## 2025-04-09 ASSESSMENT — TONOMETRY
OD_IOP_MMHG: 10
OS_IOP_MMHG: 10
IOP_METHOD: GOLDMANN APPLANATION

## 2025-04-09 ASSESSMENT — CONF VISUAL FIELD
OD_INFERIOR_NASAL_RESTRICTION: 0
OD_SUPERIOR_TEMPORAL_RESTRICTION: 0
OS_NORMAL: 1
OS_SUPERIOR_TEMPORAL_RESTRICTION: 0
OS_SUPERIOR_NASAL_RESTRICTION: 0
OS_INFERIOR_NASAL_RESTRICTION: 0
OD_NORMAL: 1
OD_SUPERIOR_NASAL_RESTRICTION: 0
OD_INFERIOR_TEMPORAL_RESTRICTION: 0
OS_INFERIOR_TEMPORAL_RESTRICTION: 0

## 2025-04-09 ASSESSMENT — ENCOUNTER SYMPTOMS: EYES NEGATIVE: 1

## 2025-04-09 ASSESSMENT — VISUAL ACUITY
OS_CC: 20/40
CORRECTION_TYPE: GLASSES
OD_CC: 20/40
METHOD: SNELLEN - LINEAR

## 2025-04-09 ASSESSMENT — EXTERNAL EXAM - RIGHT EYE: OD_EXAM: NORMAL

## 2025-04-09 ASSESSMENT — CUP TO DISC RATIO
OD_RATIO: .3
OS_RATIO: .3

## 2025-04-09 ASSESSMENT — EXTERNAL EXAM - LEFT EYE: OS_EXAM: NORMAL

## 2025-04-09 NOTE — PROGRESS NOTES
Assessment/Plan   Diagnoses and all orders for this visit:  Keratoconjunctivitis sicca of both eyes due to decreased tear production  -     erythromycin (Romycin) 5 mg/gram (0.5 %) ophthalmic ointment; Apply to both eyes once daily at bedtime for 10 days. Apply Amount per Dose: 0.5 inch (~1 cm) per dose.  -     Cequa 0.09 % dropperette; Administer 1 drop into affected eye(s) 2 times a day.  Filamentary keratitis of both eyes  -     erythromycin (Romycin) 5 mg/gram (0.5 %) ophthalmic ointment; Apply to both eyes once daily at bedtime for 10 days. Apply Amount per Dose: 0.5 inch (~1 cm) per dose.  4 filaments removed from R cornea at SL, pt tolerated well, no complications/epi defect.  Dry eye syndrome of both lacrimal glands  Meibomian gland dysfunction right eye, upper and lower eyelids  Meibomian gland dysfunction left eye, upper and lower eyelids  Severe dry eye. Switch to cequa from restasis (ineffective). Add tyrvaya intranasal spray bid (sample first can Rx if patient likes). Add back emycin qhs OU, add back ATs at least tid if not more (PF). Discussed AST vs Prokera. Can check PA for Prokera but patient does not want to pursue at this time. Also consider plug RLL, BCL but would require abx prophylaxis. Could consider scleral CLs. Monitor 3 months w/ tear osmo, SLE.     Myopia of both eyes  Regular astigmatism of both eyes  Presbyopia  New spec rx released today per patient request. Ocular health wnl for age OU. Monitor 1 year or sooner prn. Refraction billed today.    High risk medication use  Sjogrens syndrome  Studies reveal that the risk of maculopathy when taking Plaquenil is 0% (0-5 years), 1% (over 5 years), 2% (over 10 years), and 20% cumulative, or 4% annual incidence (over 20 years) respectively.  Annual testing with 10-2 threshold visual field perimetry, as well as spectral domain optical coherence tomography of the macula is recommended. No signs of plaquenil toxicity today od/os, macula OCT today,  HVF 10-2 pt unable to complete due to dry eye.   monitor 1 year or sooner with any acute vision change. HVF 10-2 and OCT Mac at next comprehensive exam.       Impression          1 M35.01 Keratoconjunctivitis sicca, in Sjogren's syndrome-Stable     2 H01.009 Mgd (meibomian gland disease)-Stable     3 H25.13 Cataract, nuclear sclerotic, both eyes-Stable     4 H52.10 Myopia with astigmatism and presbyopia-Stable     5 H35.351 Cystoid macular edema of right eye-Improving     6 H33.21 Retinal detachment, right-Stable     7 H35.371 Epiretinal membrane, right eye-Stable     8 Z79.899 Long-term use of Plaquenil-Stable     9 H04.123 Dry eye syndrome of both eyes-Stable     10 H25.812 Combined forms of age-related cataract of left eye-Stable     11 H16.123 Filamentary keratitis of both eyes-Stable          DIAGNOSTIC PROCEDURE DONE    OCT DONE OD/OS            REASON FOR TEST: will help address and tailor  therapy by detecting subclinical CME SRF     Hi quality OCT  scans obtained  signal good    OCT OD - Normal Foveal Contour, No Edema, IS/OS Junction Normal  OCT OS - Normal Foveal Contour, No Edema, IS/OS Junction Normal    additional commnents:                 Discussion            A/P:   1) Rhegmatogenous retinal detachment, Right eye   - s/p pneumatic retinopexy (9/13/18)   - s/p laser retinopexy (9/15/18)   - s/p 25g vitrectomy, endolaser, C3F8 gas insertion, scleral buckle OD on 11/13/2018      now reattached with mild nasal ERM and minimal CME   had catarct surgery CME now no CME OD         2) Sjogren`s Syndrome/VILMA        resume ATs and Xiidra gtts        follow-up with cornea      3) Plaquenil use   No signs of plaquenil toxicity      RTC 12 months

## 2025-04-15 ENCOUNTER — HOSPITAL ENCOUNTER (OUTPATIENT)
Dept: RADIOLOGY | Facility: CLINIC | Age: 70
Discharge: HOME | End: 2025-04-15
Payer: MEDICARE

## 2025-04-15 DIAGNOSIS — R92.1 BREAST CALCIFICATION, RIGHT: ICD-10-CM

## 2025-04-15 DIAGNOSIS — R92.1 BREAST CALCIFICATIONS ON MAMMOGRAM: ICD-10-CM

## 2025-04-15 DIAGNOSIS — R92.8 ABNORMAL MAMMOGRAM OF RIGHT BREAST: Primary | ICD-10-CM

## 2025-04-15 PROCEDURE — 77061 BREAST TOMOSYNTHESIS UNI: CPT | Mod: RIGHT SIDE | Performed by: RADIOLOGY

## 2025-04-15 PROCEDURE — 76642 ULTRASOUND BREAST LIMITED: CPT | Mod: RIGHT SIDE | Performed by: RADIOLOGY

## 2025-04-15 PROCEDURE — 77061 BREAST TOMOSYNTHESIS UNI: CPT | Mod: RT

## 2025-04-15 PROCEDURE — 77065 DX MAMMO INCL CAD UNI: CPT | Mod: RIGHT SIDE | Performed by: RADIOLOGY

## 2025-04-15 PROCEDURE — 76982 USE 1ST TARGET LESION: CPT | Mod: RT

## 2025-04-15 PROCEDURE — 76642 ULTRASOUND BREAST LIMITED: CPT | Mod: RT

## 2025-05-05 ENCOUNTER — PATIENT MESSAGE (OUTPATIENT)
Dept: INFUSION THERAPY | Facility: CLINIC | Age: 70
End: 2025-05-05
Payer: MEDICARE

## 2025-05-09 ENCOUNTER — LAB (OUTPATIENT)
Dept: LAB | Facility: HOSPITAL | Age: 70
End: 2025-05-09
Payer: MEDICARE

## 2025-05-09 DIAGNOSIS — M81.0 OSTEOPOROSIS, UNSPECIFIED OSTEOPOROSIS TYPE, UNSPECIFIED PATHOLOGICAL FRACTURE PRESENCE: ICD-10-CM

## 2025-05-09 LAB
ALBUMIN SERPL BCP-MCNC: 4 G/DL (ref 3.4–5)
ALP SERPL-CCNC: 75 U/L (ref 33–136)
ALT SERPL W P-5'-P-CCNC: 12 U/L (ref 7–45)
ANION GAP SERPL CALC-SCNC: 14 MMOL/L (ref 10–20)
AST SERPL W P-5'-P-CCNC: 20 U/L (ref 9–39)
BILIRUB SERPL-MCNC: 0.4 MG/DL (ref 0–1.2)
BUN SERPL-MCNC: 15 MG/DL (ref 6–23)
CALCIUM SERPL-MCNC: 9.6 MG/DL (ref 8.6–10.3)
CHLORIDE SERPL-SCNC: 103 MMOL/L (ref 98–107)
CO2 SERPL-SCNC: 26 MMOL/L (ref 21–32)
CREAT SERPL-MCNC: 1.13 MG/DL (ref 0.5–1.05)
EGFRCR SERPLBLD CKD-EPI 2021: 52 ML/MIN/1.73M*2
GLUCOSE SERPL-MCNC: 94 MG/DL (ref 74–99)
POTASSIUM SERPL-SCNC: 4 MMOL/L (ref 3.5–5.3)
PROT SERPL-MCNC: 7.3 G/DL (ref 6.4–8.2)
SODIUM SERPL-SCNC: 139 MMOL/L (ref 136–145)

## 2025-05-09 PROCEDURE — 80053 COMPREHEN METABOLIC PANEL: CPT

## 2025-05-14 ENCOUNTER — INFUSION (OUTPATIENT)
Dept: INFUSION THERAPY | Facility: CLINIC | Age: 70
End: 2025-05-14
Payer: MEDICARE

## 2025-05-14 ENCOUNTER — APPOINTMENT (OUTPATIENT)
Dept: INFUSION THERAPY | Facility: CLINIC | Age: 70
End: 2025-05-14
Payer: MEDICARE

## 2025-05-14 VITALS
TEMPERATURE: 97.2 F | OXYGEN SATURATION: 98 % | RESPIRATION RATE: 17 BRPM | HEART RATE: 75 BPM | SYSTOLIC BLOOD PRESSURE: 124 MMHG | DIASTOLIC BLOOD PRESSURE: 77 MMHG

## 2025-05-14 DIAGNOSIS — M81.0 OSTEOPOROSIS, UNSPECIFIED OSTEOPOROSIS TYPE, UNSPECIFIED PATHOLOGICAL FRACTURE PRESENCE: ICD-10-CM

## 2025-05-14 PROCEDURE — 96372 THER/PROPH/DIAG INJ SC/IM: CPT | Performed by: REGISTERED NURSE

## 2025-05-14 RX ORDER — FAMOTIDINE 10 MG/ML
20 INJECTION, SOLUTION INTRAVENOUS ONCE AS NEEDED
OUTPATIENT
Start: 2025-11-05

## 2025-05-14 RX ORDER — DIPHENHYDRAMINE HYDROCHLORIDE 50 MG/ML
50 INJECTION, SOLUTION INTRAMUSCULAR; INTRAVENOUS AS NEEDED
OUTPATIENT
Start: 2025-11-05

## 2025-05-14 RX ORDER — EPINEPHRINE 0.3 MG/.3ML
0.3 INJECTION SUBCUTANEOUS EVERY 5 MIN PRN
OUTPATIENT
Start: 2025-11-05

## 2025-05-14 RX ORDER — ALBUTEROL SULFATE 0.83 MG/ML
3 SOLUTION RESPIRATORY (INHALATION) AS NEEDED
OUTPATIENT
Start: 2025-11-05

## 2025-05-14 ASSESSMENT — ENCOUNTER SYMPTOMS
EXTREMITY WEAKNESS: 0
WHEEZING: 0
LIGHT-HEADEDNESS: 0
NUMBNESS: 0
COUGH: 0
PALPITATIONS: 0
SHORTNESS OF BREATH: 0
WOUND: 0
DIZZINESS: 0
LEG SWELLING: 0

## 2025-05-14 NOTE — PATIENT INSTRUCTIONS
Today :We administered denosumab.     For:   1. Osteoporosis, unspecified osteoporosis type, unspecified pathological fracture presence         Your next appointment is due in:  6 months        Please read the  Medication Guide that was given to you and reviewed during todays visit.     (Tell all doctors including dentists that you are taking this medication)     Go to the emergency room or call 911 if:  -You have signs of allergic reaction:   -Rash, hives, itching.   -Swollen, blistered, peeling skin.   -Swelling of face, lips, mouth, tongue or throat.   -Tightness of chest, trouble breathing, swallowing or talking     Call your doctor:  - If IV / injection site gets red, warm, swollen, itchy or leaks fluid or pus.     (Leave dressing on your IV site for at least 2 hours and keep area clean and dry  - If you get sick or have symptoms of infection or are not feeling well for any reason.    (Wash your hands often, stay away from people who are sick)  - If you have side effects from your medication that do not go away or are bothersome.     (Refer to the teaching your nurse gave you for side effects to call your doctor about)    - Common side effects may include:  stuffy nose, headache, feeling tired, muscle aches, upset stomach  - Before receiving any vaccines     - Call the [facility] at   If:  - You get sick, are on antibiotics, have had a recent vaccine, have surgery or dental work and your doctor wants your visit rescheduled.  - You need to cancel and reschedule your visit for any reason. Call at least 2 days before your visit if you need to cancel.   - Your insurance changes before your next visit.    (We will need to get approval from your new insurance. This can take up to two weeks.)     The [facility] is opened Monday thru Friday. We are closed on weekends and holidays.   Voice mail will take your call if the center is closed. If you leave a message please allow 24 hours for  a call back during weekdays. If you leave a message on a weekend/holiday, we will call you back the next business day.    A pharmacist is available Monday - Friday from 8:30AM to 3:30PM to help answer any questions you may have about your prescriptions(s). Please call pharmacy at:    [facility]: [phone]  [facility]: [phone]  [facility]: [phone]

## 2025-05-14 NOTE — PROGRESS NOTES
[facility]   Infusion Clinic Note   Date: May 14, 2025   Name: Loulou Hand  : 1955   MRN: [id number]         Reason for Visit: Injections (60 mg prolia injection)         Today: We administered denosumab.       Ordered By: Marianela Hernandez,*       For a Diagnosis of: Osteoporosis, unspecified osteoporosis type, unspecified pathological fracture presence       At today's visit patient accompanied by: Self      Today's Vitals:   Vitals:    25 1257   BP: 124/77   Pulse: 75   Resp: 17   Temp: 36.2 °C (97.2 °F)   SpO2: 98%             Pre - Treatment Checklist:      - Previous reaction to current treatment: no      (Assess patient for the concerns below. Document provider notification as appropriate).  - Active or recent infection with/without current antibiotic use: no  - Recent or planned invasive dental work: no  - Recent or planned surgeries: no  - Recently received or plans to receive vaccinations: no  - Has treatment related toxicities: no  - Any chance may be pregnant:  no      Pain: 3   - Is the pain different from normal: no   - Is prescribing Doctor aware:  n/a      Labs: N/A      Fall Risk Screening: Ortega Fall Risk  History of Falling, Immediate or Within 3 Months: No  Secondary Diagnosis: No  Ambulatory Aid: Crutches/cane/walker  Intravenous Therapy/Heparin Lock: No  Gait/Transferring: Weak  Mental Status: Oriented to own ability  Ortega Fall Risk Score: 25       Review Of Systems:  Review of Systems   Respiratory:  Negative for cough, shortness of breath and wheezing.    Cardiovascular:  Negative for chest pain, leg swelling and palpitations.   Skin:  Negative for itching, rash and wound.   Neurological:  Negative for dizziness, extremity weakness, light-headedness and numbness.         Infusion Readiness:  - Assessment Concerns Related to Infusion: No  - Provider notified: n/a      New Patient Education:    N/A (returning patient for continuation of therapy.  Ongoing education provided as needed.)        Treatment Conditions & Drug Specific Questions:    Denosumab  (PROLIA. XGEVA. STOBOCLO)    (Unless otherwise specified on patient specific therapy plan):     TREATMENT CONDITIONS:  Unless otherwise specified on patient specific therapy plan HOLD and notify provider prior to proceeding with today's injection if patients:  O Corrected or Serum Calcium LESS THAN 8.6 mg/dL  OR Ionized calcium less than 1.1 mmol/L or  less than 4.7 mg/dL (depending on resulting agency)  o Recent or planned invasive dental procedure (within 4 weeks)    Lab Results   Component Value Date    CALCIUM 9.6 05/09/2025      Lab Results   Component Value Date    CAION 1.28 03/14/2023       Patient meets treatment conditions? Yes    DRUG SPECIFIC QUESTIONS:  Is the patient taking calcium and vitamin D? Yes  (Recommended)    Pt Instructed on following risks: (1) hypocalcemia, (2) osteonecrosis of the jaw, (3) atypical femoral fractures, (4) serious infections, and (5) dermatologic reactions?  Yes      REMINDER:  PREGNANCY CATEGORY X DRUG. OBTAIN NEGTATIVE PREGNANCY TEST PRIOR TO FIRST INFUSION FOR WOMEN OF CHILDBEARING ABILITY   REMS DRUG    Recommended Vitals/Observation:  Vitals: Obtain vitals prior to injection.  Observation: Patient may leave immediately following injection.        Weight Based Drug Calculations:    WEIGHT BASED DRUGS: NOT APPLICABLE / FLAT DOSE       Post Treatment: Patient tolerated treatment without issue and was discharged in no apparent distress.      Note Authored / Patient Cared for By: Laurie Rosas RN

## 2025-05-21 ENCOUNTER — APPOINTMENT (OUTPATIENT)
Dept: PRIMARY CARE | Facility: CLINIC | Age: 70
End: 2025-05-21
Payer: MEDICARE

## 2025-05-21 VITALS
BODY MASS INDEX: 30.83 KG/M2 | HEIGHT: 63 IN | SYSTOLIC BLOOD PRESSURE: 127 MMHG | TEMPERATURE: 96.4 F | HEART RATE: 80 BPM | DIASTOLIC BLOOD PRESSURE: 82 MMHG | WEIGHT: 174 LBS

## 2025-05-21 DIAGNOSIS — Z71.89 ADVANCE CARE PLANNING: ICD-10-CM

## 2025-05-21 DIAGNOSIS — Z02.2 ENCOUNTER FOR EXAMINATION FOR ADMISSION TO ASSISTED LIVING FACILITY: Primary | ICD-10-CM

## 2025-05-21 DIAGNOSIS — Z11.1 TUBERCULOSIS SCREENING: ICD-10-CM

## 2025-05-21 DIAGNOSIS — K75.4 AUTOIMMUNE HEPATITIS (MULTI): ICD-10-CM

## 2025-05-21 DIAGNOSIS — Z11.1 SCREENING FOR TUBERCULOSIS: ICD-10-CM

## 2025-05-21 PROCEDURE — 99214 OFFICE O/P EST MOD 30 MIN: CPT | Performed by: FAMILY MEDICINE

## 2025-05-21 PROCEDURE — 3074F SYST BP LT 130 MM HG: CPT | Performed by: FAMILY MEDICINE

## 2025-05-21 PROCEDURE — 1159F MED LIST DOCD IN RCRD: CPT | Performed by: FAMILY MEDICINE

## 2025-05-21 PROCEDURE — 3008F BODY MASS INDEX DOCD: CPT | Performed by: FAMILY MEDICINE

## 2025-05-21 PROCEDURE — G2211 COMPLEX E/M VISIT ADD ON: HCPCS | Performed by: FAMILY MEDICINE

## 2025-05-21 PROCEDURE — 3079F DIAST BP 80-89 MM HG: CPT | Performed by: FAMILY MEDICINE

## 2025-05-21 NOTE — PROGRESS NOTES
"Subjective   Patient ID: Loulou Hand is a 70 y.o. female who presents for Follow-up (Follow up requesting assisted living forms filled out. ).    HPI   Here today for follow-up and completion of paperwork for admission to assisted living facility.  Overall, reports she is doing well.  Continues to use walker to assist with ambulation  Blood pressure has been stable with current regimen of amlodipine and atenolol.  Denies lightheadedness or dizziness.  Anxiety symptoms stable on current dose of duloxetine.  No depressed mood  Autoimmune hepatitis has been in remission.  Most recent LFTs normal.  Sjogren's-continues to follow with rheumatology, on cevimeline.  Also does receiving Prolia for management of osteoporosis.  Review of Systems   Constitutional:  Negative for fatigue and fever.   HENT:  Negative for mouth sores and trouble swallowing.    Respiratory:  Negative for cough, chest tightness and shortness of breath.    Cardiovascular:  Negative for chest pain, palpitations and leg swelling.   Gastrointestinal:  Negative for abdominal pain, constipation and diarrhea.   Musculoskeletal:  Positive for arthralgias and gait problem. Negative for joint swelling.   Neurological:  Negative for dizziness, light-headedness and headaches.   Psychiatric/Behavioral:  Negative for dysphoric mood. The patient is not nervous/anxious.        Objective   /82 (BP Location: Left arm, Patient Position: Sitting)   Pulse 80   Temp 35.8 °C (96.4 °F)   Ht 1.6 m (5' 3\")   Wt 78.9 kg (174 lb)   BMI 30.82 kg/m²     Physical Exam  Vitals reviewed.   Constitutional:       General: She is not in acute distress.     Appearance: Normal appearance. She is not ill-appearing.   HENT:      Head: Normocephalic and atraumatic.      Mouth/Throat:      Mouth: Mucous membranes are moist.   Eyes:      Extraocular Movements: Extraocular movements intact.      Conjunctiva/sclera: Conjunctivae normal.   Cardiovascular:      Rate and Rhythm: " Normal rate and regular rhythm.      Pulses: Normal pulses.      Heart sounds: Normal heart sounds. No murmur heard.  Pulmonary:      Effort: Pulmonary effort is normal.      Breath sounds: Normal breath sounds.   Abdominal:      General: Bowel sounds are normal.      Palpations: Abdomen is soft.      Tenderness: There is no abdominal tenderness.   Musculoskeletal:      Cervical back: Neck supple.      Right lower leg: No edema.      Left lower leg: No edema.   Lymphadenopathy:      Cervical: No cervical adenopathy.   Skin:     General: Skin is warm and dry.   Neurological:      Mental Status: She is alert and oriented to person, place, and time. Mental status is at baseline.      Cranial Nerves: No cranial nerve deficit.      Gait: Gait abnormal.   Psychiatric:         Mood and Affect: Mood normal.         Behavior: Behavior normal.         Thought Content: Thought content normal.         Assessment/Plan   Assessment & Plan  Encounter for examination for admission to assisted living facility  Reviewed paperwork in detail with patient completed in her presence  Medication list reviewed and updated so that accurate med list can be provided to new facility       Advance care planning  Extensive discussion regarding advance care planning, DNR paperwork.  [facility] DNR form completed       Screening for tuberculosis  If needed, order for T spot entered, otherwise may proceed with PPD placement at assisted living facility       Tuberculosis screening    Orders:    T-Spot TB; Future    Autoimmune hepatitis (Multi)  Currently in remission

## 2025-05-25 ASSESSMENT — ENCOUNTER SYMPTOMS
DIARRHEA: 0
FEVER: 0
FATIGUE: 0
CHEST TIGHTNESS: 0
NERVOUS/ANXIOUS: 0
COUGH: 0
ABDOMINAL PAIN: 0
SHORTNESS OF BREATH: 0
CONSTIPATION: 0
ARTHRALGIAS: 1
JOINT SWELLING: 0
DIZZINESS: 0
PALPITATIONS: 0
TROUBLE SWALLOWING: 0
LIGHT-HEADEDNESS: 0
DYSPHORIC MOOD: 0
HEADACHES: 0

## 2025-06-01 DIAGNOSIS — M35.01 SJOGREN'S SYNDROME WITH KERATOCONJUNCTIVITIS SICCA: ICD-10-CM

## 2025-06-02 RX ORDER — HYDROXYCHLOROQUINE SULFATE 200 MG/1
TABLET, FILM COATED ORAL
Qty: 90 TABLET | Refills: 1 | Status: SHIPPED | OUTPATIENT
Start: 2025-06-02

## 2025-07-18 ENCOUNTER — TELEPHONE (OUTPATIENT)
Dept: PRIMARY CARE | Facility: CLINIC | Age: 70
End: 2025-07-18
Payer: MEDICARE

## 2025-07-18 NOTE — TELEPHONE ENCOUNTER
Received notification from her pharmacy benefit plan with concerns that she has taken tramadol in combination with duloxetine.  I do not see tramadol on her current med list.  However, if she is taking both of these medications in combination, it may cause increased levels of tramadol in her blood levels and reduce levels of tramadol as active metabolite.  This decrease in active metabolite can cause symptoms of opioid withdrawal and reduce its efficacy at managing pain.  Recommend she use sparingly and monitor for any symptoms

## 2025-08-20 ENCOUNTER — APPOINTMENT (OUTPATIENT)
Dept: PRIMARY CARE | Facility: CLINIC | Age: 70
End: 2025-08-20
Payer: MEDICARE

## 2025-09-22 ENCOUNTER — APPOINTMENT (OUTPATIENT)
Dept: PRIMARY CARE | Facility: CLINIC | Age: 70
End: 2025-09-22
Payer: MEDICARE

## 2025-10-28 ENCOUNTER — APPOINTMENT (OUTPATIENT)
Dept: RHEUMATOLOGY | Facility: CLINIC | Age: 70
End: 2025-10-28
Payer: MEDICARE

## 2026-02-11 ENCOUNTER — APPOINTMENT (OUTPATIENT)
Dept: PRIMARY CARE | Facility: CLINIC | Age: 71
End: 2026-02-11
Payer: MEDICARE

## 2026-04-20 ENCOUNTER — APPOINTMENT (OUTPATIENT)
Dept: OPHTHALMOLOGY | Facility: CLINIC | Age: 71
End: 2026-04-20
Payer: MEDICARE